# Patient Record
Sex: MALE | Race: WHITE | NOT HISPANIC OR LATINO | Employment: OTHER | URBAN - METROPOLITAN AREA
[De-identification: names, ages, dates, MRNs, and addresses within clinical notes are randomized per-mention and may not be internally consistent; named-entity substitution may affect disease eponyms.]

---

## 2017-09-02 ENCOUNTER — HISTORICAL (OUTPATIENT)
Dept: INFUSION THERAPY | Facility: HOSPITAL | Age: 57
End: 2017-09-02

## 2017-09-03 ENCOUNTER — HISTORICAL (OUTPATIENT)
Dept: INFUSION THERAPY | Facility: HOSPITAL | Age: 57
End: 2017-09-03

## 2017-09-04 ENCOUNTER — HISTORICAL (OUTPATIENT)
Dept: INFUSION THERAPY | Facility: HOSPITAL | Age: 57
End: 2017-09-04

## 2017-09-05 ENCOUNTER — HISTORICAL (OUTPATIENT)
Dept: INFUSION THERAPY | Facility: HOSPITAL | Age: 57
End: 2017-09-05

## 2017-09-06 ENCOUNTER — HISTORICAL (OUTPATIENT)
Dept: INFUSION THERAPY | Facility: HOSPITAL | Age: 57
End: 2017-09-06

## 2017-09-07 ENCOUNTER — HISTORICAL (OUTPATIENT)
Dept: INFUSION THERAPY | Facility: HOSPITAL | Age: 57
End: 2017-09-07

## 2017-09-07 LAB
ABS NEUT (OLG): 5.96 X10(3)/MCL (ref 2.1–9.2)
ALBUMIN SERPL-MCNC: 2.7 GM/DL (ref 3.4–5)
ALBUMIN/GLOB SERPL: 1 RATIO (ref 1–2)
ALP SERPL-CCNC: 123 UNIT/L (ref 45–117)
ALT SERPL-CCNC: 66 UNIT/L (ref 12–78)
AST SERPL-CCNC: 36 UNIT/L (ref 15–37)
BASOPHILS # BLD AUTO: 0.01 X10(3)/MCL
BASOPHILS NFR BLD AUTO: 0 % (ref 0–1)
BILIRUB SERPL-MCNC: 0.4 MG/DL (ref 0.2–1)
BILIRUBIN DIRECT+TOT PNL SERPL-MCNC: 0.1 MG/DL
BILIRUBIN DIRECT+TOT PNL SERPL-MCNC: 0.3 MG/DL
BUN SERPL-MCNC: 6 MG/DL (ref 7–18)
CALCIUM SERPL-MCNC: 8.5 MG/DL (ref 8.5–10.1)
CHLORIDE SERPL-SCNC: 104 MMOL/L (ref 98–107)
CO2 SERPL-SCNC: 32 MMOL/L (ref 21–32)
CREAT SERPL-MCNC: 1 MG/DL (ref 0.6–1.3)
EOSINOPHIL # BLD AUTO: 0.03 10*3/UL
EOSINOPHIL NFR BLD AUTO: 0 % (ref 0–5)
ERYTHROCYTE [DISTWIDTH] IN BLOOD BY AUTOMATED COUNT: 17.2 % (ref 11.5–14.5)
GLOBULIN SER-MCNC: 4.3 GM/ML (ref 2.3–3.5)
GLUCOSE SERPL-MCNC: 302 MG/DL (ref 74–106)
HCT VFR BLD AUTO: 29.8 % (ref 40–51)
HGB BLD-MCNC: 8.8 GM/DL (ref 13.5–17.5)
IMM GRANULOCYTES # BLD AUTO: 0.28 10*3/UL
IMM GRANULOCYTES NFR BLD AUTO: 3 %
LYMPHOCYTES # BLD AUTO: 1.37 X10(3)/MCL
LYMPHOCYTES NFR BLD AUTO: 17 % (ref 15–40)
MCH RBC QN AUTO: 23.3 PG (ref 26–34)
MCHC RBC AUTO-ENTMCNC: 29.5 GM/DL (ref 31–37)
MCV RBC AUTO: 78.8 FL (ref 80–100)
MONOCYTES # BLD AUTO: 0.6 X10(3)/MCL
MONOCYTES NFR BLD AUTO: 7 % (ref 4–12)
NEUTROPHILS # BLD AUTO: 5.96 X10(3)/MCL
NEUTROPHILS NFR BLD AUTO: 72 X10(3)/MCL
PLATELET # BLD AUTO: 58 X10(3)/MCL (ref 130–400)
PMV BLD AUTO: 10.3 FL (ref 7.4–10.4)
POTASSIUM SERPL-SCNC: 2.9 MMOL/L (ref 3.5–5.1)
PROT SERPL-MCNC: 7 GM/DL (ref 6.4–8.2)
RBC # BLD AUTO: 3.78 X10(6)/MCL (ref 4.5–5.9)
SODIUM SERPL-SCNC: 143 MMOL/L (ref 136–145)
WBC # SPEC AUTO: 8.2 X10(3)/MCL (ref 4.5–11)

## 2017-09-08 ENCOUNTER — HISTORICAL (OUTPATIENT)
Dept: INFUSION THERAPY | Facility: HOSPITAL | Age: 57
End: 2017-09-08

## 2017-09-14 ENCOUNTER — HISTORICAL (OUTPATIENT)
Dept: INFUSION THERAPY | Facility: HOSPITAL | Age: 57
End: 2017-09-14

## 2017-09-14 LAB
ABS NEUT (OLG): 4.5 X10(3)/MCL (ref 2.1–9.2)
ALBUMIN SERPL-MCNC: 2.9 GM/DL (ref 3.4–5)
ALBUMIN/GLOB SERPL: 1 RATIO (ref 1–2)
ALP SERPL-CCNC: 117 UNIT/L (ref 45–117)
ALT SERPL-CCNC: 62 UNIT/L (ref 12–78)
ANISOCYTOSIS BLD QL SMEAR: NORMAL
AST SERPL-CCNC: 46 UNIT/L (ref 15–37)
BASOPHILS # BLD AUTO: 0.02 X10(3)/MCL
BASOPHILS NFR BLD AUTO: 0 % (ref 0–1)
BILIRUB SERPL-MCNC: 0.5 MG/DL (ref 0.2–1)
BILIRUBIN DIRECT+TOT PNL SERPL-MCNC: 0.2 MG/DL
BILIRUBIN DIRECT+TOT PNL SERPL-MCNC: 0.3 MG/DL
BUN SERPL-MCNC: 5 MG/DL (ref 7–18)
CALCIUM SERPL-MCNC: 9 MG/DL (ref 8.5–10.1)
CHLORIDE SERPL-SCNC: 99 MMOL/L (ref 98–107)
CO2 SERPL-SCNC: 34 MMOL/L (ref 21–32)
CREAT SERPL-MCNC: 0.9 MG/DL (ref 0.6–1.3)
EOSINOPHIL # BLD AUTO: 0.04 10*3/UL
EOSINOPHIL NFR BLD AUTO: 1 % (ref 0–5)
ERYTHROCYTE [DISTWIDTH] IN BLOOD BY AUTOMATED COUNT: 17.5 % (ref 11.5–14.5)
GLOBULIN SER-MCNC: 4.5 GM/ML (ref 2.3–3.5)
GLUCOSE SERPL-MCNC: 287 MG/DL (ref 74–106)
HCT VFR BLD AUTO: 33.3 % (ref 40–51)
HGB BLD-MCNC: 9.9 GM/DL (ref 13.5–17.5)
IMM GRANULOCYTES # BLD AUTO: 0.09 10*3/UL
IMM GRANULOCYTES NFR BLD AUTO: 2 %
LYMPHOCYTES # BLD AUTO: 0.73 X10(3)/MCL
LYMPHOCYTES NFR BLD AUTO: 13 % (ref 15–40)
MCH RBC QN AUTO: 23.6 PG (ref 26–34)
MCHC RBC AUTO-ENTMCNC: 29.7 GM/DL (ref 31–37)
MCV RBC AUTO: 79.3 FL (ref 80–100)
MICROCYTES BLD QL SMEAR: NORMAL
MONOCYTES # BLD AUTO: 0.31 X10(3)/MCL
MONOCYTES NFR BLD AUTO: 5 % (ref 4–12)
NEUTROPHILS # BLD AUTO: 4.5 X10(3)/MCL
NEUTROPHILS NFR BLD AUTO: 79 X10(3)/MCL
PLATELET # BLD AUTO: 70 X10(3)/MCL (ref 130–400)
PLATELET # BLD EST: NORMAL 10*3/UL
PMV BLD AUTO: 10.7 FL (ref 7.4–10.4)
POLYCHROMASIA BLD QL SMEAR: NORMAL
POTASSIUM SERPL-SCNC: 3.2 MMOL/L (ref 3.5–5.1)
PROT SERPL-MCNC: 7.4 GM/DL (ref 6.4–8.2)
RBC # BLD AUTO: 4.2 X10(6)/MCL (ref 4.5–5.9)
RBC MORPH BLD: NORMAL
SODIUM SERPL-SCNC: 140 MMOL/L (ref 136–145)
SPHEROCYTES BLD QL SMEAR: NORMAL
WBC # SPEC AUTO: 5.7 X10(3)/MCL (ref 4.5–11)

## 2018-04-11 ENCOUNTER — HOSPITAL ENCOUNTER (OUTPATIENT)
Dept: MEDSURG UNIT | Facility: HOSPITAL | Age: 58
End: 2018-04-12
Attending: HOSPITALIST | Admitting: HOSPITALIST

## 2018-04-11 LAB
ABS NEUT (OLG): 7.31 X10(3)/MCL (ref 2.1–9.2)
ALBUMIN SERPL-MCNC: 3.6 GM/DL (ref 3.4–5)
ALBUMIN/GLOB SERPL: 1 RATIO (ref 1–2)
ALP SERPL-CCNC: 115 UNIT/L (ref 45–117)
ALT SERPL-CCNC: 19 UNIT/L (ref 12–78)
APPEARANCE, UA: CLEAR
AST SERPL-CCNC: 12 UNIT/L (ref 15–37)
BACTERIA #/AREA URNS AUTO: ABNORMAL /[HPF]
BASOPHILS # BLD AUTO: 0.06 X10(3)/MCL
BASOPHILS NFR BLD AUTO: 1 %
BILIRUB SERPL-MCNC: 0.3 MG/DL (ref 0.2–1)
BILIRUB UR QL STRIP: NEGATIVE
BILIRUBIN DIRECT+TOT PNL SERPL-MCNC: 0.1 MG/DL
BILIRUBIN DIRECT+TOT PNL SERPL-MCNC: 0.2 MG/DL
BUN SERPL-MCNC: 11 MG/DL (ref 7–18)
CALCIUM SERPL-MCNC: 8.7 MG/DL (ref 8.5–10.1)
CHLORIDE SERPL-SCNC: 106 MMOL/L (ref 98–107)
CO2 SERPL-SCNC: 31 MMOL/L (ref 21–32)
COLOR UR: YELLOW
CREAT SERPL-MCNC: 0.9 MG/DL (ref 0.6–1.3)
EOSINOPHIL # BLD AUTO: 0.16 10*3/UL
EOSINOPHIL NFR BLD AUTO: 2 %
ERYTHROCYTE [DISTWIDTH] IN BLOOD BY AUTOMATED COUNT: 17.9 % (ref 11.5–14.5)
GLOBULIN SER-MCNC: 3.6 GM/ML (ref 2.3–3.5)
GLUCOSE (UA): NORMAL
GLUCOSE SERPL-MCNC: 129 MG/DL (ref 74–106)
HCO3 UR-SCNC: 28.5 MMOL/L (ref 22–26)
HCT VFR BLD AUTO: 34.2 % (ref 40–51)
HGB BLD-MCNC: 9.5 GM/DL (ref 13.5–17.5)
HGB UR QL STRIP: NEGATIVE
HYALINE CASTS #/AREA URNS LPF: ABNORMAL /[LPF]
IMM GRANULOCYTES # BLD AUTO: 0.07 10*3/UL
IMM GRANULOCYTES NFR BLD AUTO: 1 %
KETONES UR QL STRIP: ABNORMAL
LEUKOCYTE ESTERASE UR QL STRIP: 75 LEU/UL
LYMPHOCYTES # BLD AUTO: 1.6 X10(3)/MCL
LYMPHOCYTES NFR BLD AUTO: 16 % (ref 13–40)
MCH RBC QN AUTO: 20.8 PG (ref 26–34)
MCHC RBC AUTO-ENTMCNC: 27.8 GM/DL (ref 31–37)
MCV RBC AUTO: 74.8 FL (ref 80–100)
MONOCYTES # BLD AUTO: 0.71 X10(3)/MCL
MONOCYTES NFR BLD AUTO: 7 % (ref 4–12)
NEUTROPHILS # BLD AUTO: 7.31 X10(3)/MCL
NEUTROPHILS NFR BLD AUTO: 74 X10(3)/MCL
NITRITE UR QL STRIP: NEGATIVE
O2 HGB ARTERIAL: 88.9 % (ref 94–100)
PCO2 BLDA: 47 MMHG (ref 35–45)
PH SMN: 7.39 [PH] (ref 7.35–7.45)
PH UR STRIP: 6 [PH] (ref 4.5–8)
PLATELET # BLD AUTO: 415 X10(3)/MCL (ref 130–400)
PMV BLD AUTO: 9.6 FL (ref 7.4–10.4)
PO2 BLDA: 68 MMHG (ref 75–100)
POC ALLENS TEST: POSITIVE
POC BE: 3 (ref -2–2)
POC CO HGB: 6.1 % (ref 0.5–1.5)
POC CO2: 29.9 MMOL/L (ref 22–27)
POC MET HGB: 0.1 % (ref 0–1.5)
POC SAMPLESOURCE: ABNORMAL
POC SATURATED O2: 94.8 % (ref 95–98)
POC SITE: ABNORMAL
POC THB: 9.7 GM/DL (ref 12–18)
POC TREATMENT: ABNORMAL
POTASSIUM SERPL-SCNC: 4.6 MMOL/L (ref 3.5–5.1)
PROT SERPL-MCNC: 7.2 GM/DL (ref 6.4–8.2)
PROT UR QL STRIP: 30 MG/DL
RBC # BLD AUTO: 4.57 X10(6)/MCL (ref 4.5–5.9)
RBC #/AREA URNS AUTO: ABNORMAL /[HPF]
SODIUM SERPL-SCNC: 143 MMOL/L (ref 136–145)
SP GR UR STRIP: 1.03 (ref 1–1.03)
SQUAMOUS #/AREA URNS LPF: ABNORMAL /[LPF]
UROBILINOGEN UR STRIP-ACNC: 2 MG/DL
WBC # SPEC AUTO: 9.9 X10(3)/MCL (ref 4.5–11)
WBC #/AREA URNS AUTO: ABNORMAL /HPF

## 2018-04-12 LAB
ABS NEUT (OLG): 10.34 X10(3)/MCL (ref 2.1–9.2)
ALBUMIN SERPL-MCNC: 3.3 GM/DL (ref 3.4–5)
ALBUMIN/GLOB SERPL: 1 RATIO (ref 1–2)
ALP SERPL-CCNC: 101 UNIT/L (ref 45–117)
ALT SERPL-CCNC: 17 UNIT/L (ref 12–78)
AST SERPL-CCNC: 14 UNIT/L (ref 15–37)
BASOPHILS # BLD AUTO: 0.02 X10(3)/MCL
BASOPHILS NFR BLD AUTO: 0 %
BILIRUB SERPL-MCNC: 0.2 MG/DL (ref 0.2–1)
BILIRUBIN DIRECT+TOT PNL SERPL-MCNC: <0.1 MG/DL
BILIRUBIN DIRECT+TOT PNL SERPL-MCNC: ABNORMAL MG/DL
BUN SERPL-MCNC: 13 MG/DL (ref 7–18)
CALCIUM SERPL-MCNC: 8.6 MG/DL (ref 8.5–10.1)
CHLORIDE SERPL-SCNC: 103 MMOL/L (ref 98–107)
CO2 SERPL-SCNC: 28 MMOL/L (ref 21–32)
CREAT SERPL-MCNC: 0.7 MG/DL (ref 0.6–1.3)
ERYTHROCYTE [DISTWIDTH] IN BLOOD BY AUTOMATED COUNT: 17.5 % (ref 11.5–14.5)
GLOBULIN SER-MCNC: 3.4 GM/ML (ref 2.3–3.5)
GLUCOSE SERPL-MCNC: 199 MG/DL (ref 74–106)
HCT VFR BLD AUTO: 30.9 % (ref 40–51)
HGB BLD-MCNC: 8.9 GM/DL (ref 13.5–17.5)
IMM GRANULOCYTES # BLD AUTO: 0.16 10*3/UL
IMM GRANULOCYTES NFR BLD AUTO: 1 %
LYMPHOCYTES # BLD AUTO: 1.16 X10(3)/MCL
LYMPHOCYTES NFR BLD AUTO: 10 % (ref 13–40)
MCH RBC QN AUTO: 21.1 PG (ref 26–34)
MCHC RBC AUTO-ENTMCNC: 28.8 GM/DL (ref 31–37)
MCV RBC AUTO: 73.2 FL (ref 80–100)
MONOCYTES # BLD AUTO: 0.48 X10(3)/MCL
MONOCYTES NFR BLD AUTO: 4 % (ref 4–12)
NEUTROPHILS # BLD AUTO: 10.34 X10(3)/MCL
NEUTROPHILS NFR BLD AUTO: 85 X10(3)/MCL
PLATELET # BLD AUTO: 414 X10(3)/MCL (ref 130–400)
PMV BLD AUTO: 9.8 FL (ref 7.4–10.4)
POTASSIUM SERPL-SCNC: 3.8 MMOL/L (ref 3.5–5.1)
PROT SERPL-MCNC: 6.7 GM/DL (ref 6.4–8.2)
RBC # BLD AUTO: 4.22 X10(6)/MCL (ref 4.5–5.9)
SODIUM SERPL-SCNC: 140 MMOL/L (ref 136–145)
WBC # SPEC AUTO: 12.2 X10(3)/MCL (ref 4.5–11)

## 2018-04-17 ENCOUNTER — HOSPITAL ENCOUNTER (OUTPATIENT)
Dept: INTENSIVE CARE | Facility: HOSPITAL | Age: 58
End: 2018-04-19
Attending: HOSPITALIST | Admitting: HOSPITALIST

## 2018-04-17 LAB
ABS NEUT (OLG): 12.23 X10(3)/MCL (ref 2.1–9.2)
ALBUMIN SERPL-MCNC: 3.5 GM/DL (ref 3.4–5)
ALBUMIN/GLOB SERPL: 1 RATIO (ref 1–2)
ALP SERPL-CCNC: 100 UNIT/L (ref 45–117)
ALT SERPL-CCNC: 23 UNIT/L (ref 12–78)
APPEARANCE, UA: CLEAR
AST SERPL-CCNC: 14 UNIT/L (ref 15–37)
BACTERIA #/AREA URNS AUTO: ABNORMAL /[HPF]
BASOPHILS # BLD AUTO: 0.03 X10(3)/MCL
BASOPHILS NFR BLD AUTO: 0 %
BILIRUB SERPL-MCNC: 0.4 MG/DL (ref 0.2–1)
BILIRUB UR QL STRIP: NEGATIVE
BILIRUBIN DIRECT+TOT PNL SERPL-MCNC: 0.1 MG/DL
BILIRUBIN DIRECT+TOT PNL SERPL-MCNC: 0.3 MG/DL
BUN SERPL-MCNC: 10 MG/DL (ref 7–18)
CALCIUM SERPL-MCNC: 8.9 MG/DL (ref 8.5–10.1)
CHLORIDE SERPL-SCNC: 106 MMOL/L (ref 98–107)
CO2 SERPL-SCNC: 31 MMOL/L (ref 21–32)
COLOR UR: YELLOW
CREAT SERPL-MCNC: 0.8 MG/DL (ref 0.6–1.3)
EOSINOPHIL # BLD AUTO: 0.1 X10(3)/MCL
EOSINOPHIL NFR BLD AUTO: 1 %
ERYTHROCYTE [DISTWIDTH] IN BLOOD BY AUTOMATED COUNT: 17.3 % (ref 11.5–14.5)
GLOBULIN SER-MCNC: 3.4 GM/ML (ref 2.3–3.5)
GLUCOSE (UA): NORMAL
GLUCOSE SERPL-MCNC: 127 MG/DL (ref 74–106)
HCT VFR BLD AUTO: 35 % (ref 40–51)
HGB BLD-MCNC: 9.8 GM/DL (ref 13.5–17.5)
HGB UR QL STRIP: NEGATIVE
HYALINE CASTS #/AREA URNS LPF: ABNORMAL /[LPF]
IMM GRANULOCYTES # BLD AUTO: 0.1 10*3/UL
IMM GRANULOCYTES NFR BLD AUTO: 1 %
KETONES UR QL STRIP: NEGATIVE
LACTATE SERPL-SCNC: 1.9 MMOL/L (ref 0.4–2)
LEUKOCYTE ESTERASE UR QL STRIP: NEGATIVE
LYMPHOCYTES # BLD AUTO: 1.31 X10(3)/MCL
LYMPHOCYTES NFR BLD AUTO: 9 % (ref 13–40)
MCH RBC QN AUTO: 20.8 PG (ref 26–34)
MCHC RBC AUTO-ENTMCNC: 28 GM/DL (ref 31–37)
MCV RBC AUTO: 74.2 FL (ref 80–100)
MONOCYTES # BLD AUTO: 0.72 X10(3)/MCL
MONOCYTES NFR BLD AUTO: 5 % (ref 4–12)
NEUTROPHILS # BLD AUTO: 12.23 X10(3)/MCL
NEUTROPHILS NFR BLD AUTO: 84 X10(3)/MCL
NITRITE UR QL STRIP: NEGATIVE
PH UR STRIP: 6 [PH] (ref 4.5–8)
PLATELET # BLD AUTO: 442 X10(3)/MCL (ref 130–400)
PMV BLD AUTO: 9.4 FL (ref 7.4–10.4)
POTASSIUM SERPL-SCNC: 4 MMOL/L (ref 3.5–5.1)
PROT SERPL-MCNC: 6.9 GM/DL (ref 6.4–8.2)
PROT UR QL STRIP: 20 MG/DL
RBC # BLD AUTO: 4.72 X10(6)/MCL (ref 4.5–5.9)
RBC #/AREA URNS AUTO: ABNORMAL /[HPF]
SODIUM SERPL-SCNC: 143 MMOL/L (ref 136–145)
SP GR UR STRIP: >1.03 (ref 1–1.03)
SQUAMOUS #/AREA URNS LPF: ABNORMAL /[LPF]
UROBILINOGEN UR STRIP-ACNC: NORMAL
WBC # SPEC AUTO: 14.5 X10(3)/MCL (ref 4.5–11)
WBC #/AREA URNS AUTO: ABNORMAL /HPF

## 2018-04-18 LAB
ABS NEUT (OLG): 6.6 X10(3)/MCL (ref 2.1–9.2)
ALBUMIN SERPL-MCNC: 3.1 GM/DL (ref 3.4–5)
ALBUMIN/GLOB SERPL: 1 RATIO (ref 1–2)
ALP SERPL-CCNC: 87 UNIT/L (ref 45–117)
ALT SERPL-CCNC: 20 UNIT/L (ref 12–78)
AST SERPL-CCNC: 11 UNIT/L (ref 15–37)
BASOPHILS # BLD AUTO: 0.02 X10(3)/MCL
BASOPHILS NFR BLD AUTO: 0 %
BILIRUB SERPL-MCNC: 0.3 MG/DL (ref 0.2–1)
BILIRUBIN DIRECT+TOT PNL SERPL-MCNC: 0.1 MG/DL
BILIRUBIN DIRECT+TOT PNL SERPL-MCNC: 0.2 MG/DL
BUN SERPL-MCNC: 13 MG/DL (ref 7–18)
CALCIUM SERPL-MCNC: 8.6 MG/DL (ref 8.5–10.1)
CHLORIDE SERPL-SCNC: 106 MMOL/L (ref 98–107)
CO2 SERPL-SCNC: 31 MMOL/L (ref 21–32)
CREAT SERPL-MCNC: 0.8 MG/DL (ref 0.6–1.3)
EOSINOPHIL # BLD AUTO: 0.07 X10(3)/MCL
EOSINOPHIL NFR BLD AUTO: 1 %
ERYTHROCYTE [DISTWIDTH] IN BLOOD BY AUTOMATED COUNT: 17.3 % (ref 11.5–14.5)
GLOBULIN SER-MCNC: 4.4 GM/ML (ref 2.3–3.5)
GLUCOSE SERPL-MCNC: 92 MG/DL (ref 74–106)
HCT VFR BLD AUTO: 31.3 % (ref 40–51)
HGB BLD-MCNC: 8.8 GM/DL (ref 13.5–17.5)
IMM GRANULOCYTES # BLD AUTO: 0.06 10*3/UL
IMM GRANULOCYTES NFR BLD AUTO: 1 %
IRON SATN MFR SERPL: 6.2 % (ref 15–50)
IRON SERPL-MCNC: 26 MCG/DL (ref 65–175)
LYMPHOCYTES # BLD AUTO: 1.24 X10(3)/MCL
LYMPHOCYTES NFR BLD AUTO: 14 % (ref 13–40)
MCH RBC QN AUTO: 20.7 PG (ref 26–34)
MCHC RBC AUTO-ENTMCNC: 28.1 GM/DL (ref 31–37)
MCV RBC AUTO: 73.5 FL (ref 80–100)
MONOCYTES # BLD AUTO: 0.62 X10(3)/MCL
MONOCYTES NFR BLD AUTO: 7 % (ref 4–12)
NEUTROPHILS # BLD AUTO: 6.6 X10(3)/MCL
NEUTROPHILS NFR BLD AUTO: 77 X10(3)/MCL
PLATELET # BLD AUTO: 392 X10(3)/MCL (ref 130–400)
PMV BLD AUTO: 10 FL (ref 7.4–10.4)
POTASSIUM SERPL-SCNC: 3.7 MMOL/L (ref 3.5–5.1)
PROT SERPL-MCNC: 7.5 GM/DL (ref 6.4–8.2)
RBC # BLD AUTO: 4.26 X10(6)/MCL (ref 4.5–5.9)
SODIUM SERPL-SCNC: 142 MMOL/L (ref 136–145)
TIBC SERPL-MCNC: 418 MCG/DL (ref 250–450)
TRANSFERRIN SERPL-MCNC: 324 MG/DL (ref 200–360)
WBC # SPEC AUTO: 8.6 X10(3)/MCL (ref 4.5–11)

## 2018-04-19 LAB
ABS NEUT (OLG): 4.4 X10(3)/MCL (ref 2.1–9.2)
ALBUMIN SERPL-MCNC: 2.9 GM/DL (ref 3.4–5)
ALBUMIN/GLOB SERPL: 0 RATIO (ref 1–2)
ALP SERPL-CCNC: 77 UNIT/L (ref 45–117)
ALT SERPL-CCNC: 22 UNIT/L (ref 12–78)
AST SERPL-CCNC: 16 UNIT/L (ref 15–37)
BASOPHILS # BLD AUTO: 0.03 X10(3)/MCL
BASOPHILS NFR BLD AUTO: 0 %
BILIRUB SERPL-MCNC: 0.2 MG/DL (ref 0.2–1)
BILIRUBIN DIRECT+TOT PNL SERPL-MCNC: <0.1 MG/DL
BILIRUBIN DIRECT+TOT PNL SERPL-MCNC: ABNORMAL MG/DL
BUN SERPL-MCNC: 12 MG/DL (ref 7–18)
CALCIUM SERPL-MCNC: 8.5 MG/DL (ref 8.5–10.1)
CHLORIDE SERPL-SCNC: 105 MMOL/L (ref 98–107)
CO2 SERPL-SCNC: 30 MMOL/L (ref 21–32)
CREAT SERPL-MCNC: 0.8 MG/DL (ref 0.6–1.3)
EOSINOPHIL # BLD AUTO: 0.11 X10(3)/MCL
EOSINOPHIL NFR BLD AUTO: 2 %
ERYTHROCYTE [DISTWIDTH] IN BLOOD BY AUTOMATED COUNT: 17.3 % (ref 11.5–14.5)
GLOBULIN SER-MCNC: 6.1 GM/ML (ref 2.3–3.5)
GLUCOSE SERPL-MCNC: 114 MG/DL (ref 74–106)
HCT VFR BLD AUTO: 30.1 % (ref 40–51)
HGB BLD-MCNC: 8.4 GM/DL (ref 13.5–17.5)
IMM GRANULOCYTES # BLD AUTO: 0.04 10*3/UL
IMM GRANULOCYTES NFR BLD AUTO: 1 %
LYMPHOCYTES # BLD AUTO: 1.43 X10(3)/MCL
LYMPHOCYTES NFR BLD AUTO: 22 % (ref 13–40)
MCH RBC QN AUTO: 20.6 PG (ref 26–34)
MCHC RBC AUTO-ENTMCNC: 27.9 GM/DL (ref 31–37)
MCV RBC AUTO: 73.8 FL (ref 80–100)
MONOCYTES # BLD AUTO: 0.51 X10(3)/MCL
MONOCYTES NFR BLD AUTO: 8 % (ref 4–12)
NEUTROPHILS # BLD AUTO: 4.4 X10(3)/MCL
NEUTROPHILS NFR BLD AUTO: 68 X10(3)/MCL
PLATELET # BLD AUTO: 385 X10(3)/MCL (ref 130–400)
PMV BLD AUTO: 10.1 FL (ref 7.4–10.4)
POTASSIUM SERPL-SCNC: 3.3 MMOL/L (ref 3.5–5.1)
PROT SERPL-MCNC: 9 GM/DL (ref 6.4–8.2)
RBC # BLD AUTO: 4.08 X10(6)/MCL (ref 4.5–5.9)
SODIUM SERPL-SCNC: 140 MMOL/L (ref 136–145)
WBC # SPEC AUTO: 6.5 X10(3)/MCL (ref 4.5–11)

## 2018-04-27 ENCOUNTER — HISTORICAL (OUTPATIENT)
Dept: INFUSION THERAPY | Facility: HOSPITAL | Age: 58
End: 2018-04-27

## 2018-04-27 LAB
ALBUMIN SERPL-MCNC: 3.3 GM/DL (ref 3.4–5)
ALBUMIN/GLOB SERPL: 1 RATIO (ref 1–2)
ALP SERPL-CCNC: 79 UNIT/L (ref 45–117)
ALT SERPL-CCNC: 23 UNIT/L (ref 12–78)
AST SERPL-CCNC: 19 UNIT/L (ref 15–37)
BILIRUB SERPL-MCNC: 0.2 MG/DL (ref 0.2–1)
BILIRUBIN DIRECT+TOT PNL SERPL-MCNC: <0.1 MG/DL
BILIRUBIN DIRECT+TOT PNL SERPL-MCNC: ABNORMAL MG/DL
BUN SERPL-MCNC: 15 MG/DL (ref 7–18)
CALCIUM SERPL-MCNC: 8.8 MG/DL (ref 8.5–10.1)
CHLORIDE SERPL-SCNC: 106 MMOL/L (ref 98–107)
CO2 SERPL-SCNC: 29 MMOL/L (ref 21–32)
CREAT SERPL-MCNC: 0.8 MG/DL (ref 0.6–1.3)
GLOBULIN SER-MCNC: 4.5 GM/ML (ref 2.3–3.5)
GLUCOSE SERPL-MCNC: 164 MG/DL (ref 74–106)
POTASSIUM SERPL-SCNC: 3.7 MMOL/L (ref 3.5–5.1)
PROT SERPL-MCNC: 7.8 GM/DL (ref 6.4–8.2)
SODIUM SERPL-SCNC: 142 MMOL/L (ref 136–145)

## 2018-05-04 ENCOUNTER — HISTORICAL (OUTPATIENT)
Dept: INFUSION THERAPY | Facility: HOSPITAL | Age: 58
End: 2018-05-04

## 2018-05-11 ENCOUNTER — HISTORICAL (OUTPATIENT)
Dept: INFUSION THERAPY | Facility: HOSPITAL | Age: 58
End: 2018-05-11

## 2018-05-11 LAB
BUN SERPL-MCNC: 14 MG/DL (ref 7–18)
CALCIUM SERPL-MCNC: 8.7 MG/DL (ref 8.5–10.1)
CHLORIDE SERPL-SCNC: 104 MMOL/L (ref 98–107)
CO2 SERPL-SCNC: 28 MMOL/L (ref 21–32)
CREAT SERPL-MCNC: 1 MG/DL (ref 0.6–1.3)
CREAT/UREA NIT SERPL: 14
GLUCOSE SERPL-MCNC: 208 MG/DL (ref 74–106)
POTASSIUM SERPL-SCNC: 3.9 MMOL/L (ref 3.5–5.1)
SODIUM SERPL-SCNC: 137 MMOL/L (ref 136–145)

## 2018-05-18 ENCOUNTER — HISTORICAL (OUTPATIENT)
Dept: INFUSION THERAPY | Facility: HOSPITAL | Age: 58
End: 2018-05-18

## 2018-05-18 LAB
BUN SERPL-MCNC: 14 MG/DL (ref 7–18)
CALCIUM SERPL-MCNC: 9.2 MG/DL (ref 8.5–10.1)
CHLORIDE SERPL-SCNC: 103 MMOL/L (ref 98–107)
CO2 SERPL-SCNC: 29 MMOL/L (ref 21–32)
CREAT SERPL-MCNC: 1 MG/DL (ref 0.6–1.3)
CREAT/UREA NIT SERPL: 14
GLUCOSE SERPL-MCNC: 163 MG/DL (ref 74–106)
POTASSIUM SERPL-SCNC: 4 MMOL/L (ref 3.5–5.1)
SODIUM SERPL-SCNC: 138 MMOL/L (ref 136–145)

## 2018-05-25 ENCOUNTER — HISTORICAL (OUTPATIENT)
Dept: INFUSION THERAPY | Facility: HOSPITAL | Age: 58
End: 2018-05-25

## 2018-05-25 LAB
BUN SERPL-MCNC: 11 MG/DL (ref 7–18)
CALCIUM SERPL-MCNC: 8.7 MG/DL (ref 8.5–10.1)
CHLORIDE SERPL-SCNC: 103 MMOL/L (ref 98–107)
CO2 SERPL-SCNC: 29 MMOL/L (ref 21–32)
CREAT SERPL-MCNC: 1 MG/DL (ref 0.6–1.3)
CREAT/UREA NIT SERPL: 11
GLUCOSE SERPL-MCNC: 151 MG/DL (ref 74–106)
POTASSIUM SERPL-SCNC: 4.1 MMOL/L (ref 3.5–5.1)
SODIUM SERPL-SCNC: 140 MMOL/L (ref 136–145)

## 2018-06-01 ENCOUNTER — HISTORICAL (OUTPATIENT)
Dept: INFUSION THERAPY | Facility: HOSPITAL | Age: 58
End: 2018-06-01

## 2018-06-01 LAB
BUN SERPL-MCNC: 11 MG/DL (ref 7–18)
CALCIUM SERPL-MCNC: 8.8 MG/DL (ref 8.5–10.1)
CHLORIDE SERPL-SCNC: 105 MMOL/L (ref 98–107)
CO2 SERPL-SCNC: 29 MMOL/L (ref 21–32)
CREAT SERPL-MCNC: 0.9 MG/DL (ref 0.6–1.3)
CREAT/UREA NIT SERPL: 12
GLUCOSE SERPL-MCNC: 174 MG/DL (ref 74–106)
POTASSIUM SERPL-SCNC: 3.9 MMOL/L (ref 3.5–5.1)
SODIUM SERPL-SCNC: 144 MMOL/L (ref 136–145)

## 2018-09-06 ENCOUNTER — HISTORICAL (OUTPATIENT)
Dept: INTERNAL MEDICINE | Facility: CLINIC | Age: 58
End: 2018-09-06

## 2018-09-06 LAB
ABS NEUT (OLG): 7.09 X10(3)/MCL
ALBUMIN SERPL-MCNC: 3.4 GM/DL (ref 3.4–5)
ALBUMIN/GLOB SERPL: 1 RATIO (ref 1–2)
ALP SERPL-CCNC: 95 UNIT/L (ref 45–117)
ALT SERPL-CCNC: 26 UNIT/L (ref 12–78)
ANISOCYTOSIS BLD QL SMEAR: NORMAL
APPEARANCE, UA: CLEAR
AST SERPL-CCNC: 11 UNIT/L (ref 15–37)
BACTERIA #/AREA URNS AUTO: ABNORMAL /[HPF]
BASOPHILS # BLD AUTO: 0.05 X10(3)/MCL
BASOPHILS NFR BLD AUTO: 0 %
BILIRUB SERPL-MCNC: 0.2 MG/DL (ref 0.2–1)
BILIRUB UR QL STRIP: NEGATIVE
BILIRUBIN DIRECT+TOT PNL SERPL-MCNC: <0.1 MG/DL
BILIRUBIN DIRECT+TOT PNL SERPL-MCNC: ABNORMAL MG/DL
BUN SERPL-MCNC: 14 MG/DL (ref 7–18)
CALCIUM SERPL-MCNC: 9.4 MG/DL (ref 8.5–10.1)
CHLORIDE SERPL-SCNC: 105 MMOL/L (ref 98–107)
CHOLEST SERPL-MCNC: 156 MG/DL
CHOLEST/HDLC SERPL: 2.9 {RATIO} (ref 0–5)
CO2 SERPL-SCNC: 33 MMOL/L (ref 21–32)
COLOR UR: YELLOW
CREAT SERPL-MCNC: 1 MG/DL (ref 0.6–1.3)
CREAT UR-MCNC: 280 MG/DL
EOSINOPHIL # BLD AUTO: 0.14 X10(3)/MCL
EOSINOPHIL NFR BLD AUTO: 1 %
ERYTHROCYTE [DISTWIDTH] IN BLOOD BY AUTOMATED COUNT: 17.5 % (ref 11.5–14.5)
EST. AVERAGE GLUCOSE BLD GHB EST-MCNC: 206 MG/DL
GLOBULIN SER-MCNC: 3.6 GM/ML (ref 2.3–3.5)
GLUCOSE (UA): NORMAL
GLUCOSE SERPL-MCNC: 197 MG/DL (ref 74–106)
HBA1C MFR BLD: 8.8 % (ref 4.2–6.3)
HCT VFR BLD AUTO: 34.3 % (ref 40–51)
HDLC SERPL-MCNC: 54 MG/DL
HGB BLD-MCNC: 9.3 GM/DL (ref 13.5–17.5)
HGB UR QL STRIP: NEGATIVE
HYALINE CASTS #/AREA URNS LPF: ABNORMAL /[LPF]
HYPOCHROMIA BLD QL SMEAR: NORMAL
IMM GRANULOCYTES # BLD AUTO: 0.11 10*3/UL
IMM GRANULOCYTES NFR BLD AUTO: 1 %
KETONES UR QL STRIP: NEGATIVE
LDLC SERPL CALC-MCNC: 69 MG/DL (ref 0–130)
LEUKOCYTE ESTERASE UR QL STRIP: NEGATIVE
LYMPHOCYTES # BLD AUTO: 2.56 X10(3)/MCL
LYMPHOCYTES NFR BLD AUTO: 24 % (ref 13–40)
MCH RBC QN AUTO: 20.3 PG (ref 26–34)
MCHC RBC AUTO-ENTMCNC: 27.1 GM/DL (ref 31–37)
MCV RBC AUTO: 74.7 FL (ref 80–100)
MICROALBUMIN UR-MCNC: 56 MG/L (ref 0–19)
MICROALBUMIN/CREAT RATIO PNL UR: 20 MCG/MG CR (ref 0–29)
MICROCYTES BLD QL SMEAR: NORMAL
MONOCYTES # BLD AUTO: 0.83 X10(3)/MCL
MONOCYTES NFR BLD AUTO: 8 % (ref 4–12)
NEUTROPHILS # BLD AUTO: 7.09 X10(3)/MCL
NEUTROPHILS NFR BLD AUTO: 66 %
NITRITE UR QL STRIP: NEGATIVE
PH UR STRIP: 6 [PH] (ref 4.5–8)
PLATELET # BLD AUTO: 451 X10(3)/MCL (ref 130–400)
PLATELET # BLD EST: NORMAL 10*3/UL
PMV BLD AUTO: 9.9 FL (ref 7.4–10.4)
POIKILOCYTOSIS BLD QL SMEAR: NORMAL
POLYCHROMASIA BLD QL SMEAR: NORMAL
POTASSIUM SERPL-SCNC: 4.1 MMOL/L (ref 3.5–5.1)
PROT SERPL-MCNC: 7 GM/DL (ref 6.4–8.2)
PROT UR QL STRIP: 30 MG/DL
RBC # BLD AUTO: 4.59 X10(6)/MCL (ref 4.5–5.9)
RBC #/AREA URNS AUTO: ABNORMAL /[HPF]
RBC MORPH BLD: NORMAL
SODIUM SERPL-SCNC: 143 MMOL/L (ref 136–145)
SP GR UR STRIP: 1.03 (ref 1–1.03)
SQUAMOUS #/AREA URNS LPF: ABNORMAL /[LPF]
TARGETS BLD QL SMEAR: NORMAL
TRIGL SERPL-MCNC: 167 MG/DL
UROBILINOGEN UR STRIP-ACNC: 3 MG/DL
VLDLC SERPL CALC-MCNC: 33 MG/DL
WBC # SPEC AUTO: 10.8 X10(3)/MCL (ref 4.5–11)
WBC #/AREA URNS AUTO: ABNORMAL /HPF

## 2019-01-21 ENCOUNTER — HISTORICAL (OUTPATIENT)
Dept: FAMILY MEDICINE | Facility: CLINIC | Age: 59
End: 2019-01-21

## 2019-01-21 LAB
EST. AVERAGE GLUCOSE BLD GHB EST-MCNC: 232 MG/DL
HBA1C MFR BLD: 9.7 % (ref 4.2–6.3)

## 2019-02-06 ENCOUNTER — HISTORICAL (OUTPATIENT)
Dept: RADIOLOGY | Facility: HOSPITAL | Age: 59
End: 2019-02-06

## 2019-04-23 ENCOUNTER — HISTORICAL (OUTPATIENT)
Dept: INTERNAL MEDICINE | Facility: CLINIC | Age: 59
End: 2019-04-23

## 2019-04-23 LAB
EST. AVERAGE GLUCOSE BLD GHB EST-MCNC: 186 MG/DL
HBA1C MFR BLD: 8.1 % (ref 4.2–6.3)

## 2019-05-29 ENCOUNTER — HISTORICAL (OUTPATIENT)
Dept: RADIOLOGY | Facility: HOSPITAL | Age: 59
End: 2019-05-29

## 2019-06-18 ENCOUNTER — HISTORICAL (OUTPATIENT)
Dept: RADIOLOGY | Facility: HOSPITAL | Age: 59
End: 2019-06-18

## 2019-08-16 ENCOUNTER — HISTORICAL (OUTPATIENT)
Dept: INTERNAL MEDICINE | Facility: CLINIC | Age: 59
End: 2019-08-16

## 2019-08-16 LAB
ABS NEUT (OLG): 5.72 X10(3)/MCL (ref 2.1–9.2)
ALBUMIN SERPL-MCNC: 3.4 GM/DL (ref 3.4–5)
ALBUMIN/GLOB SERPL: 1 RATIO (ref 1.1–2)
ALP SERPL-CCNC: 72 UNIT/L (ref 45–117)
ALT SERPL-CCNC: 30 UNIT/L (ref 12–78)
APPEARANCE, UA: CLEAR
AST SERPL-CCNC: 19 UNIT/L (ref 15–37)
BACTERIA #/AREA URNS AUTO: ABNORMAL /[HPF]
BASOPHILS # BLD AUTO: 0.03 X10(3)/MCL
BASOPHILS NFR BLD AUTO: 0 %
BILIRUB SERPL-MCNC: 0.2 MG/DL (ref 0.2–1)
BILIRUB UR QL STRIP: NEGATIVE
BILIRUBIN DIRECT+TOT PNL SERPL-MCNC: <0.1 MG/DL
BILIRUBIN DIRECT+TOT PNL SERPL-MCNC: ABNORMAL MG/DL
BUN SERPL-MCNC: 13 MG/DL (ref 7–18)
CALCIUM SERPL-MCNC: 8.7 MG/DL (ref 8.5–10.1)
CHLORIDE SERPL-SCNC: 108 MMOL/L (ref 98–107)
CO2 SERPL-SCNC: 34 MMOL/L (ref 21–32)
COLOR UR: YELLOW
CREAT SERPL-MCNC: 0.9 MG/DL (ref 0.6–1.3)
CREAT UR-MCNC: 369 MG/DL
EOSINOPHIL # BLD AUTO: 0.14 10*3/UL
EOSINOPHIL NFR BLD AUTO: 2 %
ERYTHROCYTE [DISTWIDTH] IN BLOOD BY AUTOMATED COUNT: 19.3 % (ref 11.5–14.5)
EST. AVERAGE GLUCOSE BLD GHB EST-MCNC: 229 MG/DL
GLOBULIN SER-MCNC: 3.4 GM/ML (ref 2.3–3.5)
GLUCOSE (UA): NORMAL
GLUCOSE SERPL-MCNC: 141 MG/DL (ref 74–106)
HBA1C MFR BLD: 9.6 % (ref 4.2–6.3)
HCT VFR BLD AUTO: 31.2 % (ref 40–51)
HGB BLD-MCNC: 8.5 GM/DL (ref 13.5–17.5)
HGB UR QL STRIP: NEGATIVE
HYALINE CASTS #/AREA URNS LPF: ABNORMAL /[LPF]
IMM GRANULOCYTES # BLD AUTO: 0.04 10*3/UL
IMM GRANULOCYTES NFR BLD AUTO: 0 %
KETONES UR QL STRIP: ABNORMAL
LEUKOCYTE ESTERASE UR QL STRIP: NEGATIVE
LYMPHOCYTES # BLD AUTO: 1.3 X10(3)/MCL
LYMPHOCYTES NFR BLD AUTO: 17 % (ref 13–40)
MCH RBC QN AUTO: 22.2 PG (ref 26–34)
MCHC RBC AUTO-ENTMCNC: 27.2 GM/DL (ref 31–37)
MCV RBC AUTO: 81.5 FL (ref 80–100)
MICROALBUMIN UR-MCNC: 76.9 MG/L (ref 0–19)
MICROALBUMIN/CREAT RATIO PNL UR: 20.8 MCG/MG CR (ref 0–29)
MONOCYTES # BLD AUTO: 0.6 X10(3)/MCL
MONOCYTES NFR BLD AUTO: 8 % (ref 0–24)
NEUTROPHILS # BLD AUTO: 5.72 X10(3)/MCL
NEUTROPHILS NFR BLD AUTO: 73 X10(3)/MCL
NITRITE UR QL STRIP: NEGATIVE
PH UR STRIP: 6 [PH] (ref 4.5–8)
PLATELET # BLD AUTO: 439 X10(3)/MCL (ref 130–400)
PMV BLD AUTO: 9.3 FL (ref 7.4–10.4)
POTASSIUM SERPL-SCNC: 4.6 MMOL/L (ref 3.5–5.1)
PROT SERPL-MCNC: 6.8 GM/DL (ref 6.4–8.2)
PROT UR QL STRIP: 30 MG/DL
RBC # BLD AUTO: 3.83 X10(6)/MCL (ref 4.5–5.9)
RBC #/AREA URNS AUTO: ABNORMAL /[HPF]
SODIUM SERPL-SCNC: 143 MMOL/L (ref 136–145)
SP GR UR STRIP: 1.03 (ref 1–1.03)
SQUAMOUS #/AREA URNS LPF: ABNORMAL /[LPF]
UROBILINOGEN UR STRIP-ACNC: 3 MG/DL
WBC # SPEC AUTO: 7.8 X10(3)/MCL (ref 4.5–11)
WBC #/AREA URNS AUTO: ABNORMAL /HPF

## 2019-08-21 ENCOUNTER — HISTORICAL (OUTPATIENT)
Dept: RADIOLOGY | Facility: HOSPITAL | Age: 59
End: 2019-08-21

## 2019-11-08 ENCOUNTER — HISTORICAL (OUTPATIENT)
Dept: INTERNAL MEDICINE | Facility: CLINIC | Age: 59
End: 2019-11-08

## 2019-11-08 LAB
EST. AVERAGE GLUCOSE BLD GHB EST-MCNC: 154 MG/DL
HBA1C MFR BLD: 7 % (ref 4.2–6.3)

## 2020-06-25 ENCOUNTER — HISTORICAL (OUTPATIENT)
Dept: ENDOSCOPY | Facility: HOSPITAL | Age: 60
End: 2020-06-25

## 2020-06-25 LAB — CRC RECOMMENDATION EXT: NORMAL

## 2020-11-03 ENCOUNTER — HISTORICAL (OUTPATIENT)
Dept: RADIOLOGY | Facility: HOSPITAL | Age: 60
End: 2020-11-03

## 2020-11-16 ENCOUNTER — HISTORICAL (OUTPATIENT)
Dept: CARDIOLOGY | Facility: HOSPITAL | Age: 60
End: 2020-11-16

## 2020-12-10 ENCOUNTER — HISTORICAL (OUTPATIENT)
Dept: ADMINISTRATIVE | Facility: HOSPITAL | Age: 60
End: 2020-12-10

## 2020-12-10 LAB
ALBUMIN SERPL-MCNC: 4.2 G/DL (ref 3.8–4.9)
ALBUMIN/GLOB SERPL: 1.8 {RATIO} (ref 1.2–2.2)
ALP SERPL-CCNC: 106 IU/L (ref 39–117)
ALT SERPL-CCNC: 21 IU/L (ref 0–44)
AST SERPL-CCNC: 18 IU/L (ref 0–40)
BASOPHILS # BLD AUTO: 0 X10E3/UL (ref 0–0.2)
BASOPHILS NFR BLD AUTO: 0 %
BILIRUB SERPL-MCNC: <0.2 MG/DL (ref 0–1.2)
BUN SERPL-MCNC: 21 MG/DL (ref 6–24)
CALCIUM SERPL-MCNC: 9.6 MG/DL (ref 8.7–10.2)
CHLORIDE SERPL-SCNC: 100 MMOL/L (ref 96–106)
CHOLEST SERPL-MCNC: 159 MG/DL (ref 100–199)
CHOLEST/HDLC SERPL: 4.1 RATIO (ref 0–5)
CO2 SERPL-SCNC: 25 MMOL/L (ref 20–29)
CREAT SERPL-MCNC: 1.21 MG/DL (ref 0.76–1.27)
CREAT/UREA NIT SERPL: 17 (ref 9–20)
EOSINOPHIL # BLD AUTO: 0.1 X10E3/UL (ref 0–0.4)
EOSINOPHIL NFR BLD AUTO: 2 %
ERYTHROCYTE [DISTWIDTH] IN BLOOD BY AUTOMATED COUNT: 17.2 % (ref 11.6–15.4)
GLOBULIN SER-MCNC: 2.3 G/DL (ref 1.5–4.5)
GLUCOSE SERPL-MCNC: 291 MG/DL (ref 65–99)
HCT VFR BLD AUTO: 40.9 % (ref 37.5–51)
HDLC SERPL-MCNC: 39 MG/DL
HGB BLD-MCNC: 12.3 G/DL (ref 13–17.7)
LDLC SERPL CALC-MCNC: 86 MG/DL (ref 0–99)
LYMPHOCYTES # BLD AUTO: 1.3 X10E3/UL (ref 0.7–3.1)
LYMPHOCYTES NFR BLD AUTO: 19 %
MCH RBC QN AUTO: 28.7 PG (ref 26.6–33)
MCHC RBC AUTO-ENTMCNC: 30.1 G/DL (ref 31.5–35.7)
MCV RBC AUTO: 95 FL (ref 79–97)
MICROALBUMIN/CREAT RATIO PNL UR: 24 MG/G CREAT (ref 0–29)
MONOCYTES # BLD AUTO: 0.5 X10E3/UL (ref 0.1–0.9)
MONOCYTES NFR BLD AUTO: 8 %
NEUTROPHILS # BLD AUTO: 4.7 X10E3/UL (ref 1.4–7)
NEUTROPHILS NFR BLD AUTO: 71 %
PLATELET # BLD AUTO: 309 X10E3/UL (ref 150–450)
POTASSIUM SERPL-SCNC: 4.3 MMOL/L (ref 3.5–5.2)
PROT SERPL-MCNC: 6.5 G/DL (ref 6–8.5)
RBC # BLD AUTO: 4.29 X10(6)/MCL (ref 4.14–5.8)
SODIUM SERPL-SCNC: 143 MMOL/L (ref 134–144)
TRIGL SERPL-MCNC: 200 MG/DL (ref 0–149)
VLDLC SERPL CALC-MCNC: 34 MG/DL (ref 5–40)
WBC # SPEC AUTO: 6.7 X10E3/UL (ref 3.4–10.8)

## 2021-03-31 ENCOUNTER — HISTORICAL (OUTPATIENT)
Dept: ADMINISTRATIVE | Facility: HOSPITAL | Age: 61
End: 2021-03-31

## 2021-03-31 LAB
BASOPHILS # BLD AUTO: 0 X10E3/UL (ref 0–0.2)
BASOPHILS NFR BLD AUTO: 1 %
DEPRECATED CALCIDIOL+CALCIFEROL SERPL-MC: 52.1 NG/ML (ref 30–100)
EOSINOPHIL # BLD AUTO: 0.1 X10E3/UL (ref 0–0.4)
EOSINOPHIL NFR BLD AUTO: 2 %
ERYTHROCYTE [DISTWIDTH] IN BLOOD BY AUTOMATED COUNT: 17.1 % (ref 11.6–15.4)
HCT VFR BLD AUTO: 41.9 % (ref 37.5–51)
HGB BLD-MCNC: 12.9 G/DL (ref 13–17.7)
LYMPHOCYTES # BLD AUTO: 1.3 X10E3/UL (ref 0.7–3.1)
LYMPHOCYTES NFR BLD AUTO: 16 %
MCH RBC QN AUTO: 31 PG (ref 26.6–33)
MCHC RBC AUTO-ENTMCNC: 30.8 G/DL (ref 31.5–35.7)
MCV RBC AUTO: 101 FL (ref 79–97)
MONOCYTES # BLD AUTO: 0.6 X10E3/UL (ref 0.1–0.9)
MONOCYTES NFR BLD AUTO: 7 %
NEUTROPHILS # BLD AUTO: 5.8 X10E3/UL (ref 1.4–7)
NEUTROPHILS NFR BLD AUTO: 74 %
PLATELET # BLD AUTO: 335 X10E3/UL (ref 150–450)
RBC # BLD AUTO: 4.16 X10(6)/MCL (ref 4.14–5.8)
TSH SERPL-ACNC: 2.68 MIU/ML (ref 0.45–4.5)
WBC # SPEC AUTO: 7.8 X10E3/UL (ref 3.4–10.8)

## 2021-04-21 LAB
BILIRUB SERPL-MCNC: NEGATIVE MG/DL
BLOOD URINE, POC: NORMAL
CLARITY, POC UA: NORMAL
COLOR, POC UA: NORMAL
GLUCOSE UR QL STRIP: NEGATIVE
KETONES UR QL STRIP: NORMAL
LEUKOCYTE EST, POC UA: NORMAL
NITRITE, POC UA: POSITIVE
PH, POC UA: 6
PROTEIN, POC: NORMAL
SPECIFIC GRAVITY, POC UA: 1.01
UROBILINOGEN, POC UA: NORMAL

## 2021-04-27 ENCOUNTER — HISTORICAL (OUTPATIENT)
Dept: ADMINISTRATIVE | Facility: HOSPITAL | Age: 61
End: 2021-04-27

## 2021-05-10 ENCOUNTER — HISTORICAL (OUTPATIENT)
Dept: ADMINISTRATIVE | Facility: HOSPITAL | Age: 61
End: 2021-05-10

## 2021-07-15 ENCOUNTER — HISTORICAL (OUTPATIENT)
Dept: ADMINISTRATIVE | Facility: HOSPITAL | Age: 61
End: 2021-07-15

## 2021-07-15 LAB
BASOPHILS # BLD AUTO: 0 X10E3/UL (ref 0–0.2)
BASOPHILS NFR BLD AUTO: 1 %
EOSINOPHIL # BLD AUTO: 0.1 X10E3/UL (ref 0–0.4)
EOSINOPHIL NFR BLD AUTO: 1 %
ERYTHROCYTE [DISTWIDTH] IN BLOOD BY AUTOMATED COUNT: 16.7 % (ref 11.6–15.4)
HCT VFR BLD AUTO: 42.8 % (ref 37.5–51)
HGB BLD-MCNC: 13.4 G/DL (ref 13–17.7)
LYMPHOCYTES # BLD AUTO: 1.1 X10E3/UL (ref 0.7–3.1)
LYMPHOCYTES NFR BLD AUTO: 14 %
MCH RBC QN AUTO: 32.8 PG (ref 26.6–33)
MCHC RBC AUTO-ENTMCNC: 31.3 G/DL (ref 31.5–35.7)
MCV RBC AUTO: 105 FL (ref 79–97)
MONOCYTES # BLD AUTO: 0.6 X10E3/UL (ref 0.1–0.9)
MONOCYTES NFR BLD AUTO: 8 %
NEUTROPHILS # BLD AUTO: 5.9 X10E3/UL (ref 1.4–7)
NEUTROPHILS NFR BLD AUTO: 76 %
PLATELET # BLD AUTO: 311 X10E3/UL (ref 150–450)
RBC # BLD AUTO: 4.08 X10(6)/MCL (ref 4.14–5.8)
WBC # SPEC AUTO: 7.7 X10E3/UL (ref 3.4–10.8)

## 2021-08-17 ENCOUNTER — HISTORICAL (OUTPATIENT)
Dept: ADMINISTRATIVE | Facility: HOSPITAL | Age: 61
End: 2021-08-17

## 2021-11-17 ENCOUNTER — HISTORICAL (OUTPATIENT)
Dept: ADMINISTRATIVE | Facility: HOSPITAL | Age: 61
End: 2021-11-17

## 2021-11-17 LAB
ALBUMIN SERPL-MCNC: 4.1 G/DL (ref 3.8–4.9)
ALBUMIN/GLOB SERPL: 1.8 {RATIO} (ref 1.2–2.2)
ALP SERPL-CCNC: 97 IU/L (ref 44–121)
ALT SERPL-CCNC: 37 IU/L (ref 0–44)
AST SERPL-CCNC: 44 IU/L (ref 0–40)
BASOPHILS # BLD AUTO: 0 X10E3/UL (ref 0–0.2)
BASOPHILS NFR BLD AUTO: 1 %
BILIRUB SERPL-MCNC: 0.4 MG/DL (ref 0–1.2)
BUN SERPL-MCNC: 17 MG/DL (ref 8–27)
CALCIUM SERPL-MCNC: 9.4 MG/DL (ref 8.6–10.2)
CHLORIDE SERPL-SCNC: 96 MMOL/L (ref 96–106)
CHOLEST SERPL-MCNC: 216 MG/DL (ref 100–199)
CHOLEST/HDLC SERPL: 5.4 RATIO (ref 0–5)
CO2 SERPL-SCNC: 29 MMOL/L (ref 20–29)
CREAT SERPL-MCNC: 1.05 MG/DL (ref 0.76–1.27)
CREAT/UREA NIT SERPL: 16 (ref 10–24)
EOSINOPHIL # BLD AUTO: 0.1 X10E3/UL (ref 0–0.4)
EOSINOPHIL NFR BLD AUTO: 2 %
ERYTHROCYTE [DISTWIDTH] IN BLOOD BY AUTOMATED COUNT: 15.3 % (ref 11.6–15.4)
GLOBULIN SER-MCNC: 2.3 G/DL (ref 1.5–4.5)
GLUCOSE SERPL-MCNC: 115 MG/DL (ref 65–99)
HBA1C MFR BLD: 7.3 % (ref 4.8–5.6)
HCT VFR BLD AUTO: 43.1 % (ref 37.5–51)
HDLC SERPL-MCNC: 40 MG/DL
HGB BLD-MCNC: 14 G/DL (ref 13–17.7)
LDLC SERPL CALC-MCNC: 132 MG/DL (ref 0–99)
LYMPHOCYTES # BLD AUTO: 1.2 X10E3/UL (ref 0.7–3.1)
LYMPHOCYTES NFR BLD AUTO: 16 %
MCH RBC QN AUTO: 32.1 PG (ref 26.6–33)
MCHC RBC AUTO-ENTMCNC: 32.5 G/DL (ref 31.5–35.7)
MCV RBC AUTO: 99 FL (ref 79–97)
MICROALBUMIN/CREAT RATIO PNL UR: 17 MG/G CREAT (ref 0–29)
MONOCYTES # BLD AUTO: 0.7 X10E3/UL (ref 0.1–0.9)
MONOCYTES NFR BLD AUTO: 9 %
NEUTROPHILS # BLD AUTO: 5.5 X10E3/UL (ref 1.4–7)
NEUTROPHILS NFR BLD AUTO: 72 %
PLATELET # BLD AUTO: 285 X10E3/UL (ref 150–450)
POTASSIUM SERPL-SCNC: 4 MMOL/L (ref 3.5–5.2)
PROT SERPL-MCNC: 6.4 G/DL (ref 6–8.5)
RBC # BLD AUTO: 4.36 X10(6)/MCL (ref 4.14–5.8)
SODIUM SERPL-SCNC: 140 MMOL/L (ref 134–144)
TRIGL SERPL-MCNC: 245 MG/DL (ref 0–149)
VLDLC SERPL CALC-MCNC: 44 MG/DL (ref 5–40)
WBC # SPEC AUTO: 7.6 X10E3/UL (ref 3.4–10.8)

## 2021-12-10 ENCOUNTER — HISTORICAL (OUTPATIENT)
Dept: RADIOLOGY | Facility: HOSPITAL | Age: 61
End: 2021-12-10

## 2021-12-21 ENCOUNTER — HISTORICAL (OUTPATIENT)
Dept: RADIOLOGY | Facility: HOSPITAL | Age: 61
End: 2021-12-21

## 2022-04-11 ENCOUNTER — HISTORICAL (OUTPATIENT)
Dept: ADMINISTRATIVE | Facility: HOSPITAL | Age: 62
End: 2022-04-11
Payer: MEDICARE

## 2022-04-28 VITALS
BODY MASS INDEX: 45.1 KG/M2 | SYSTOLIC BLOOD PRESSURE: 144 MMHG | OXYGEN SATURATION: 95 % | DIASTOLIC BLOOD PRESSURE: 85 MMHG | HEIGHT: 70 IN | WEIGHT: 315 LBS

## 2022-04-30 NOTE — OP NOTE
Patient:   Wilton Chavez            MRN: [id number]            FIN: [id number]               Age:   61 years     Sex:  Male     :  1960   Associated Diagnoses:   None   Author:   Vijay Gilliam MD      Preoperative Diagnosis:  Cataract  Left eye    Postoperative Diagnosis:  Cataract Left eye    Procedure:  Phacoemulsification with intraocular lens implantation Left eye    Surgeon:  Vijay Gilliam MD    Assistant:  Alanna Caban [facility]    Anestheisa:  MAC    Complications:  None    The patient was brought to the [facility] laser.  A time out was performed.  The capsulotomy, lens fragmentation and limbal relaxing incisions were completed.  Then the patient was brought into the operating suite, where the patient was correctly identified as was the operative eye via timeout.  The patient was prepped and draped in a sterile ophthalmic fashion.  A lid speculum was placed in the operative eye and the microscope was brought into place.  A 1.0 mm paracentesis was then made at (6) o'clock.  The anterior chamber was filled with Endocoat.  A (temporal) clear corneal incision was made with a 2.4 mm keratome blade.  A 6 mm corneal marking ring was used to hemant the cornea centered over the visual axis.  A 5.00 mm continuous curvilinear capsulorhexis was fashioned using a cystotome and microcapsular forceps.  Hydrodissection and hydrodelineation was performed with upreserved 1% Xylocaine.  The nucleus was then phacoemulsified with the Abbott phacoemulsification hand-piece with a total of (0) EFX.  The cortex was then removed with the I/A hand-piece.  The lens model (ZCB00) with a power of (19.5) was placed in the capsular bag.  The Helon was then removed from the eye with the I/A hand piece.  The anterior chamber was inflated and the wounds were hydrated with BSS.  The wounds were checked with Weck-Nitza sponges and found to be watertight.  The lid speculum was removed and topical antibiotics were placed on the  operative eye.  The patient was brought to PACU in good condition.

## 2022-04-30 NOTE — ED PROVIDER NOTES
Patient:   Wilton Chavez            MRN: [id number]            FIN: [id number]               Age:   57 years     Sex:  Male     :  1960   Associated Diagnoses:   MG with exacerbation (myasthenia gravis)   Author:   Jaret Estrada MD      Basic Information   Time seen: Date 2018, Immediately upon arrival.   History source: Patient, significant other.   Arrival mode: Private vehicle.   History limitation: None.   Additional information.   History of Present Illness   The patient presents with left, weakness, numbness and slurred speech.  The onset was 3  days ago.  The course/duration of symptoms is worsening.  Location: Left face. The character of symptoms is weakness, difficult to speak and numbness, not altered sensation.  The degree at onset was moderate.  The degree at maximum was moderate.  The degree at present is moderate.  Risk factors consist of diabetes mellitus, hypertension, obesity and smoking.  The patient's dominant hand is the right hand.  Prior episodes: frequent.  Therapy today: prescription medications including prednisone 10.  Associated symptoms: denies chest pain, denies vomiting and denies dizziness.  Baseline status: uses a cane.   Additional history: patient was in observation for myasthenia gravis on 18. patient states that the symptoms are the same as before: numbness to left side of face, slurred speech, left sided facial weakness that gets worse during the day. prednisone was increased from 5 -> 10 at last visit.        Review of Systems   Constitutional symptoms:  Weakness, fatigue, no fever, no chills, no sweats.    Skin symptoms:  No jaundice, no breakdown, no burns, no dryness, no petechiae.    Eye symptoms:  No recent vision problems, no discharge.    ENMT symptoms:  No ear pain, no sore throat, no nasal congestion.    Respiratory symptoms:  No shortness of breath, no orthopnea, no sputum production.    Cardiovascular symptoms:  No chest pain, no  palpitations, no diaphoresis.    Gastrointestinal symptoms:  Diarrhea, no abdominal pain, no constipation.    Genitourinary symptoms:  No dysuria, no discharge.    Musculoskeletal symptoms:  No Muscle pain, no Claudication.    Neurologic symptoms:  No numbness, no tingling, no weakness.    Psychiatric symptoms:  No anxiety, no sleeping problems, no substance abuse.    Endocrine symptoms:  No polydipsia, no polyphagia, no hyperglycemia.    Hematologic/Lymphatic symptoms:  Bleeding tendency negative, bruising tendency negative, no petechiae.    Allergy/immunologic symptoms:  No seasonal allergies, no food allergies, no impaired immunity.       Health Status   Allergies:    Allergic Reactions (Selected)  No Known Allergies,    Allergies (1) Active Reaction  No Known Allergies None Documented  .   Medications:  (Selected)   Inpatient Medications  Ordered  Miralax - for colonoscopy prep: 238 gm, form: Powder-Recon, Oral, Once, first dose 08/25/14 15:00:00 CDT, stop date 08/25/14 15:00:00 CDT  bisacodyl 5 mg ORAL enteric coated tablet: 30 mg, form: Tab-EC, Oral, Once, first dose 08/25/14 15:00:00 CDT, stop date 08/25/14 15:00:00 CDT  fluorescein 1 mg ophthalmic test: 1 EA, 1 mg =, form: Strip, OPTH, Once, first dose 02/02/17 11:53:00 CST, stop date 02/02/17 11:53:00 CST  magnesium citrate oral liquid: 1 bottle(s) = 300 mL, form: Soln, Oral, Once, first dose 08/25/14 15:00:00 CDT, stop date 08/25/14 15:00:00 CDT, If patient does not have kidney problems.  tetracaine 0.5% ophthalmic solution: 2 drop(s), form: Soln-Opth, OPTH, Once, first dose 02/02/17 11:53:00 CST, stop date 02/02/17 11:53:00 CST  Prescriptions  Prescribed  Actos 45 mg oral tablet: See Instructions, TAKE 1 & 1/2 TABLETS BY MOUTH DAILY, # 135 tab(s), 3 Refill(s), Pharmacy: [facility]  Easy touch test strips to check blood sugar daily. #100. 3 Refills.: Easy touch test strips to check blood sugar daily. #100. 3 Refills., See Instructions, Easy  touch test strips to check blood sugar daily. #100. 3 Refills.   Dx: Diabetes, # 100 EA, 0 Refill(s)  Glucometer strips and lancets: Glucometer strips and lancets, See Instructions, Check blood sugar everyday, # 100 units, 0 Refill(s), Pharmacy: [facility]  Lantus 100 units/mL subcutaneous solution: 30 units, Subcutaneous, Daily, # 10 mL, 5 Refill(s), Pharmacy: [facility]  Lipitor 10 mg oral tablet: 10 mg = 1 tab(s), Oral, Daily, # 90 tab(s), 3 Refill(s), Pharmacy: [facility]  NexIUM 40 mg oral delayed release capsule: 40 mg = 1 cap(s), Oral, Daily, on an empty stomach, # 90 cap(s), 3 Refill(s), Pharmacy: [facility]  Xalatan 0.005% ophthalmic solution: 1 drop(s), Eye-Both, Once a day (at bedtime), # 2.5 mL, 11 Refill(s), Pharmacy: [facility]  aspirin 81 mg oral tablet, CHEWABLE: 81 mg = 1 tab(s), Oral, Daily, # 90 tab(s), 3 Refill(s), Pharmacy: [facility]  brimonidine 0.1% ophthalmic solution: 1 drop(s), Eye-Both, TID, # 10 mL, 0 Refill(s)  busPIRone 15 mg oral tablet: 7.5 mg = 0.5 tab(s), Oral, BID, Please take 0.5 tablet two times a day., # 30 tab(s), 2 Refill(s), Pharmacy: [facility]  dorzolamide 2% ophthalmic solution: 1 drop(s), Eye-Both, TID, # 10 mL, 6 Refill(s), Pharmacy: [facility]  glipiZIDE 10 mg oral tablet: 10 mg = 1 tab(s), Oral, BID, # 60 tab(s), 3 Refill(s), Pharmacy: [facility]  losartan 100 mg oral tablet: 100 mg = 1 tab(s), Oral, Daily, # 90 tab(s), 3 Refill(s), Pharmacy: [facility]  metFORMIN 1000 mg oral tablet: 1,000 mg = 1 tab(s), Oral, BID, # 60 tab(s), 3 Refill(s), Pharmacy: [facility]  mycophenolate mofetil 250 mg oral capsule: 1,500 mg = 6 cap(s), Oral, BID, 1 hour before or 2 hours after meals, # 360 cap(s), 5 Refill(s), Pharmacy: [facility]  prednisONE 10 mg oral tablet: 10 mg = 1 tab(s), Oral, Daily, # 30 tab(s), 3  Refill(s)  Documented Medications  Documented  HYDROCODONE/ACETAMINOPHEN  T: 1 tab(s), Oral, TID.      Past Medical/ Family/ Social History   Medical history:    Active  Diabetic - cooperative patient ([id number])  HTN (hypertension) ([id number])  Hypercholesteremia ([id number])  Myasthenia gravis ([id number]), Reviewed as documented in chart.   Surgical history:    Colonoscopy on 8/28/2014 at 54 Years.  Comments:  8/28/2014 09:48 - Aryan MCCALL, Narcisa TYSON  auto-populated from documented surgical case  Tendon Injury ([id number]).  Comments:  8/11/2015 13:44 - Milligan RN, Desi R RH  Carpal tunnel ([id number]).  Comments:  8/11/2015 13:44 - Milligan RN, Desi R RW  Finger amputation, no complication ([id number]).  Comments:  8/11/2015 13:45 - Milligan RN, Desi R  , Reviewed as documented in chart.   Family history:    Sister    History is negative.  Father  Emphysema  COPD (chronic obstructive pulmonary disease).  Asthma.  Sleep apnea.  Mother  Heart disease  Brother  Diabetes mellitus type 2  Cancer  , Reviewed as documented in chart.   Social history: Alcohol use: Denies, Tobacco use: Regularly, 3 cigarettes per day, for the last 16 years, Drug use: Denies.   Problem list:    Active Problems (14)  Activity intolerance   At risk for aspiration   Diabetes mellitus   Diabetic - cooperative patient   HTN (hypertension)   Hypercholesteremia   Impaired mobility   Impaired mobility   Myasthenia gravis   Ocular hypertension   SERGE on CPAP   Tobacco user   Tobacco user   Tobacco user   .      Physical Examination               Vital Signs      Vital Signs (last 24 hrs)_____  Last Charted___________  Temp Oral     36.3 DegC  (APR 17 09:48)  Heart Rate Peripheral   H 102bpm  (APR 17 09:48)  Resp Rate         18 br/min  (APR 17 09:48)  SBP      122 mmHg  (APR 17 09:48)  DBP      63 mmHg  (APR 17 09:48)  SpO2      97 %   (APR 17 09:48)  Weight      136 kg  (APR 17 09:48)  Height      180 cm  (APR 17 09:48)  BMI      41.98  (APR 17 09:48)  .   General:  Alert, no acute distress.    Skin:  Warm, dry.    Eye:  Pupils are equal, round and reactive to light, extraocular movements are intact, normal conjunctiva.    Ears, nose, mouth and throat:  Oral mucosa moist, no pharyngeal erythema or exudate.    Cardiovascular:  Regular rate and rhythm, No murmur, Normal peripheral perfusion.    Respiratory:  Lungs are clear to auscultation, respirations are non-labored, breath sounds are equal.    Neurological:  Alert and oriented to person, place, time, and situation, No focal neurological deficit observed, normal sensory observed, Cranial nerves II - XII: Intact, Motor strength: Equal bilaterally, equal in UE and LE. slight left sided facial weakness , Speech: Slurred, Gait: Off-balance.    Psychiatric:  Cooperative, appropriate mood & affect, normal judgment.       Medical Decision Making   Differential Diagnosis:  myasthenia gravis flare up.   Results review:     No qualifying data available.      Impression and Plan   Diagnosis   MG with exacerbation (myasthenia gravis) ([id number]-DH G70.01)   Plan   Condition: Unchanged.    Disposition: Admit time  4/17/2018 11:27:00, Admit to Inpatient Unit, medicine on call, Dispositioned by: Time: 4/17/2018 11:29:00, Sean LOUIS, Jaret.    Notes: Discussed with PA student Ashley King..

## 2022-04-30 NOTE — DISCHARGE SUMMARY
Patient:   Wilton Chavez            MRN: [id number]            FIN: [id number]               Age:   57 years     Sex:  Male     :  1960   Associated Diagnoses:   MG with exacerbation (myasthenia gravis)   Author:   Tangela Huber MD      Discharge Information      Discharge Summary Information   Admitted  2018   Discharged  2018   Admitting physician     Regina Vasquez MD.     Referring physician for admission     Jaret Estrada MD.     Consulting physician     Adama Caceres MD     Discharge diagnosis     MG with exacerbation (myasthenia gravis) ([id number]-BD G70.01).     Discharge medications     OTHER MEDICATIONS (Selected)   Prescriptions  Prescribed  mycophenolate mofetil 250 mg oral capsule: 1,500 mg = 6 cap(s), Oral, BID, 1 hour before or 2 hours after meals, # 360 cap(s), 5 Refill(s), Pharmacy: [facility]  prednisONE 10 mg oral tablet: 10 mg = 1 tab(s), Oral, Daily, # 30 tab(s), 3 Refill(s).       To continue all other medications as previously prescribed.   IMP: Patient to take Prednisone 10mg daily instead of 5mg. To be adjusted as per neurlogist discrection.       Physical Examination      Vital Signs (last 24 hrs)_____  Last Charted___________  Temp Oral     36.1 DegC  ( 08:00)  Heart Rate Peripheral   79 bpm  ( 08:)  Resp Rate         16 br/min  (:)  SBP      135 mmHg  (:)  DBP      86 mmHg  (:)  SpO2      94 %  (:)  Weight      141 kg  (:)  Height      177 cm  (:)  BMI      45.01  (:)       Gen: NAD, +obese  HEENT: NCAT, EOMI, moist oral mucosa, no LAD, no neck masses  Resp: CTAB. Normal inspiratory effort. No cough. No hemoptysis, no crackles, wheezing.  CVS: S1, S2, regular. no murmur. 2+ pedal pulses, no edema.  Abd: NT, ND, soft, no masses. BS2+ all quadrants  MSK: NROM, Left middle digit amputated  Skin: no rash, no skin changes  Neuro: AAOx3, CNII-VII  intact. No focal deficits. Sensation intact. Strength 5 out of 5 bilateral upper and lower extremities. Reflexes 2+ bilaterally elbow, patellar. No dysmetria. No gait ataxia. Uses cane for ambulation.   Labs Last 24 Hours   Chemistry Hematology/Coagulation   Sodium Lvl: 140 mmol/L (04/12/18 06:38:37) WBC: 12.2 x10(3)/mcL High (04/12/18 06:43:51)   Potassium Lvl: 3.8 mmol/L (04/12/18 06:38:37) RBC: 4.22 x10(6)/mcL Low (04/12/18 06:43:51)   Chloride: 103 mmol/L (04/12/18 06:38:37) Hgb: 8.9 gm/dL Low (04/12/18 06:43:51)   CO2: 28 mmol/L (04/12/18 06:38:37) Hct: 30.9 % Low (04/12/18 06:43:51)   Calcium Lvl: 8.6 mg/dL (04/12/18 06:38:37) Platelet: 414 x10(3)/mcL High (04/12/18 06:43:51)   Glucose Lvl: 199 mg/dL High (04/12/18 06:38:37) MCV: 73.2 fL Low (04/12/18 06:43:51)   BUN: 13 mg/dL (04/12/18 06:38:37) MCH: 21.1 pg Low (04/12/18 06:43:51)   Creatinine: 0.7 mg/dL (04/12/18 06:38:37) MCHC: 28.8 gm/dL Low (04/12/18 06:43:51)   eGFR-AA: >105 (04/12/18 06:38:38) RDW: 17.5 % High (04/12/18 06:43:51)   eGFR-SHIVANI: >105 (04/12/18 06:38:39) MPV: 9.8 fL (04/12/18 06:43:51)   Bili Total: 0.2 mg/dL (04/12/18 06:38:37) Abs Neut: 10.34 x10(3)/mcL High (04/12/18 06:43:51)   Bili Direct: <0.1 (04/12/18 06:38:37) Neutro Auto: 85 x10(3)/mcL (04/12/18 06:43:52)   Bili Indirect: calc invalid (04/12/18 06:38:37) Lymph Auto: 10 % Low (04/12/18 06:43:52)   AST: 14 unit/L Low (04/12/18 06:38:37) Mono Auto: 4 % (04/12/18 06:43:52)   ALT: 17 unit/L (04/12/18 06:38:37) Basophil Auto: 0 (04/12/18 06:43:52)   Alk Phos: 101 unit/L (04/12/18 06:38:37) Abs Neutro: 10.34 x10(3)/mcL (04/12/18 06:43:52)   Total Protein: 6.7 gm/dL (04/12/18 06:38:37) Abs Lymph: 1.16 x10(3)/mcL (04/12/18 06:43:52)   Albumin Lvl: 3.3 gm/dL Low (04/12/18 06:38:37) Abs Mono: 0.48 x10(3)/mcL (04/12/18 06:43:52)   Globulin: 3.4 gm/mL (04/12/18 06:38:37) Abs Baso: 0.02 x10(3)/mcL (04/12/18 06:43:52)   A/G Ratio: 1 ratio (04/12/18 06:38:37) IG%: 1 % (04/12/18 06:43:52)    IG#:  0.16 (04/12/18 06:43:52)     Accession: [id number]  Order: US Carotid  Report Dt/Tm: 04/12/2018 12:56  Report:      History.   Cerebral occlusion     Reference.   None available.     Technique.   Real-time images, spectral and color pulsed doppler were performed of  both carotid bifurcations and of both vertebral arteries.  NASCET  criteria utilized.     Findings.  There is no significant plaquing of the carotid systems bilaterally.     Antegrade flow left vertebral artery. Right vertebral artery not  convincingly seen.     Velocities.  Right CCA PSV equals 103 cm/sec  Right ICA PSV equals 89 cm/sec  Right ICA/CCA PSV ratio equals 0.9.     Left CCA PSV equals 97 cm/sec  Left ICA PSV equals 124 cm/sec.  Left ICA/CCA PSV ratio equals 1.3.     Impression:  1. By velocity criteria are around 50% left internal carotid stenosis.  2. Less than 50% right internal carotid stenosis.  3. Right vertebral artery not visualized. Antegrade flow left  vertebral artery.         Hospital Course   Hospital Course   Admitted from: from emergency department.        Admit 04/11/2018; Disch 04/12/2018  Admit Dx: Exacerbation of MG; Disch Dx: same  Service: [facility] Team 4  Consults: Neuro (04/11/2018)  Procedures: none  H&P: Patient is a 57-year-old  male with past medical history of hypertension, hyperlipidemia, SERGE on CPAP, diabetes mellitus type II, carotid artery stenosis, myasthenia gravis (diagnosed 2014) presents to the ED with weakness. Patient was in his usual state of health, when at 9 AM on the morning of presentation he experienced drooping of his left eye, drooping of the left side of his face, numbness of his left cheek and slurring of speech for two hours. Symptoms were witnessed by his wife, who is present at bedside. No hx of strokes in the past, and he denies having ever had these symptoms before. He denies headache, visual changes, nausea, vomiting, chest pain, shortness of breath, sore throat, cough, abdominal  pain, diarrhea, loss of sensation or strength of his extremities bilaterally. He denies any acute ataxia, incontinence. Denies dizzines, syncope, trauma. Denies fevers, chills.  Patient is compliant with all home medications. He is not on home oxygen. He has not had a thymectomy and has never been prescribed pyridostigmine.  In Novenmber 2017 his prednisone was tapered from 10mg qday to 5mg qday. Medicine was consulted for admission of acute myasthenia gravis exacerbation.  Physical exam (per ED physician) was notable for left sided ptosis, without focal neurological findings.  CNII-XII were noted to be intact bilaterally with normal sensory, motor, and speech findings.    Course: Patient was given IV Solumedrol 125mg x1 while in the ED with relief of his symptoms.  CT of his head was negative for any acute intracranial process.  Patient was admitted for overnight observation. Carotid U/S was scheduled, given history of stenosis and risk factors- unremarkable; b/l stenosis ~50%; not requiring intervention at present time. Neurology was consulted and recommended IV Solumedrol 60mg next day; they also recommended increasing his home Prednisone to 10mg qday.  Did not recommend high-dose steroids and IVIG at present time.   Discharge Condition: stable; no acute events or neurological changes overnight.   Meds: Increase Prednisone to 10mg qday; continue other home medications.  No refills required.  Instructions: Activity and diet as tolerated.  Return to ED if symptoms return    F/u: Patient to follow up with Neurology in 1-2 weeks;  post ashby visit in IM clinic         Discharge Plan   Discharge Summary Plan   Discharge Status: improved.     Discharge instructions given: to patient.     Discharge disposition: discharge to home.     Prescriptions: continue same medications, reviewed with patient, written and given to patient.     Education and Follow-up   Counseled: patient.     Discharge Planning: Hyperglycemia,  Easy-to-Read, See PCP Dr Sarmiento as previously scheduled; f/u with Neurology in 1-2 weeks; f/u with IM clinic in 1-2 weeks.

## 2022-04-30 NOTE — OP NOTE
Patient:   Wilton Chavez            MRN: [id number]            FIN: [id number]               Age:   60 years     Sex:  Male     :  1960   Associated Diagnoses:   None   Author:   Vijay Gilliam MD      Preoperative Diagnosis:  Cataract Right eye    Postoperative Diagnosis:  Cataract Right eye    Procedure:  Phacoemulsification with intraocular lens implantation Right eye    Surgeon:  Vijay Gilliam MD    Assistant:  Alanna Caban [facility]    Anestheisa:  MAC    Complications:  None    The patient was brought to the [facility] laser.  A time out was performed.  The capsulotomy, lens fragmentation and limbal relaxing incisions were completed.  Then the patient was brought into the operating suite, where the patient was correctly identified as was the operative eye via timeout.  The patient was prepped and draped in a sterile ophthalmic fashion.  A lid speculum was placed in the operative eye and the microscope was brought into place.  A 1.0 mm paracentesis was then made at (12) o'clock.  The anterior chamber was filled with Endocoat.  A (temporal) clear corneal incision was made with a 2.4 mm keratome blade.  A 6 mm corneal marking ring was used to hemant the cornea centered over the visual axis.  A 5.00 mm continuous curvilinear capsulorhexis was fashioned using a cystotome and microcapsular forceps.  Hydrodissection and hydrodelineation was performed with upreserved 1% Xylocaine.  The nucleus was then phacoemulsified with the Abbott phacoemulsification hand-piece with a total of (1) EFX.  The cortex was then removed with the I/A hand-piece.  The lens model (ZCB00) with a power of (20.0) was placed in the capsular bag.  The Helon was then removed from the eye with the I/A hand piece.  The anterior chamber was inflated and the wounds were hydrated with BSS.  The wounds were checked with Weck-Nitza sponges and found to be watertight.  The lid speculum was removed and topical antibiotics were placed on the  operative eye.  The patient was brought to PACU in good condition.

## 2022-04-30 NOTE — ED PROVIDER NOTES
Patient:   Wilton Chavez            MRN: [id number]            FIN: [id number]               Age:   57 years     Sex:  Male     :  1960   Associated Diagnoses:   MG with exacerbation (myasthenia gravis)   Author:   Jaret Estrada MD      Basic Information   Time seen: Date & time 2018 11:48:00.   History source: Patient.   Arrival mode: Private vehicle.   History limitation: None.   Additional information: Chief Complaint from Nursing Triage Note : Chief Complaint   2018 11:32 CDT      Chief Complaint           PT W CO WEAKNESS  W SLOWED SPEECH SINCE HE WOKE UP AT 6AM, REPORTS HX OF MYSTHENIA GRAVIS.  .      History of Present Illness   The patient presents with 57-year-old male with a history of myasthenia gravis presents with weakness, fatigue, and change in speech starting this morning.  Patient denies focal motor weakness.  Patient states she's been compliant with his CellCept and prednisone.  Patient believes he is starting a flare.  No other complaints..  The course/duration of symptoms is constant.  The character of symptoms is generalized.  The degree at present is minimal.  Risk factors consist of diabetes mellitus, obesity, age and Myasthenia gravis.  Prior episodes: occasional.  Therapy today: see nurses notes.  Associated symptoms: none.        Review of Systems   Constitutional symptoms:  Weakness, fatigue.    Skin symptoms:  Negative except as documented in HPI.   Eye symptoms:  Negative except as documented in HPI.   ENMT symptoms:  Negative except as documented in HPI.   Respiratory symptoms:  Negative except as documented in HPI.   Cardiovascular symptoms:  Negative except as documented in HPI.   Gastrointestinal symptoms:  Negative except as documented in HPI.   Genitourinary symptoms:  Negative except as documented in HPI.   Musculoskeletal symptoms:  Negative except as documented in HPI.   Neurologic symptoms:  Negative except as documented in HPI.   Psychiatric  symptoms:  Negative except as documented in HPI.   Endocrine symptoms:  Negative except as documented in HPI.   Hematologic/Lymphatic symptoms:  Negative except as documented in HPI.   Allergy/immunologic symptoms:  Negative except as documented in HPI.      Health Status   Allergies:    Allergic Reactions (Selected)  No Known Allergies,    Allergies (1) Active Reaction  No Known Allergies None Documented  .      Past Medical/ Family/ Social History   Medical history:    Active  Diabetic - cooperative patient ([id number])  HTN (hypertension) ([id number])  Hypercholesteremia ([id number])  Myasthenia gravis ([id number]), Reviewed as documented in chart.   Surgical history:    Colonoscopy on 8/28/2014 at 54 Years.  Comments:  8/28/2014 09:48 - Aryan MCCALL, Narcisa TYSON  auto-populated from documented surgical case  Tendon Injury ([id number]).  Comments:  8/11/2015 13:44 - Milligan RN, Desi R RH  Carpal tunnel ([id number]).  Comments:  8/11/2015 13:44 - Milligan RN, Desi R RW  Finger amputation, no complication ([id number]).  Comments:  8/11/2015 13:45 - Milligan RN, Desi R  , Reviewed as documented in chart.   Family history:    Emphysema  Father  Diabetes mellitus type 2  Brother  Heart disease  Mother  Cancer  Brother  Asthma.  Father  Sleep apnea.  Father  COPD (chronic obstructive pulmonary disease).  Father  , Reviewed as documented in chart.   Social history: Reviewed as documented in chart, Family/social situation: Intact family.   Problem list:    Active Problems (14)  Activity intolerance   At risk for aspiration   Diabetes mellitus   Diabetic - cooperative patient   HTN (hypertension)   Hypercholesteremia   Impaired mobility   Impaired mobility   Myasthenia gravis   Ocular hypertension   SERGE on CPAP   Tobacco user   Tobacco user   Tobacco user   .      Physical Examination               Vital Signs      Vital  Signs (last 24 hrs)_____  Last Charted___________  Temp Oral     36.6 DegC  (APR 11 11:32)  Heart Rate Peripheral   81 bpm  (APR 11 11:32)  Resp Rate         18 br/min  (APR 11 11:32)  SBP      138 mmHg  (APR 11 11:32)  DBP      L 16mmHg  (APR 11 11:32)  SpO2      98 %  (APR 11 11:32)  Weight      141 kg  (APR 11 11:32)  Height      177 cm  (APR 11 11:32)  BMI      45.01  (APR 11 11:32)  .   General:  Alert, no acute distress.    Skin:  Warm, dry.    Head:  Normocephalic.   Neck:  Supple.   Eye:  Extraocular movements are intact, normal conjunctiva, Left ptosis noted.    Ears, nose, mouth and throat:  Oral mucosa moist.   Cardiovascular:  Regular rate and rhythm.   Respiratory:  Lungs are clear to auscultation, respirations are non-labored.    Chest wall:  No tenderness.   Back:  Nontender.   Musculoskeletal:  Normal ROM, no tenderness, no swelling, no deformity.    Gastrointestinal:  Soft, Nontender.    Neurological:  Alert and oriented to person, place, time, and situation, No focal neurological deficit observed, CN II-XII intact, normal sensory observed, normal motor observed, normal speech observed, normal coordination observed.    Lymphatics:  No lymphadenopathy.   Psychiatric:  Cooperative.      Medical Decision Making   Documents reviewed:  Emergency department nurses' notes, emergency department records, prior records.    Results review:  Lab results : Lab View   4/11/2018 12:20 CDT      Sample ABG                arterial                             Treatment                 room air                             Site                      Radial Rt                             pH Art                    7.39                             pCO2 Art                  47.0 mmHg  HI                             pO2 Art                   68.0 mmHg  LOW                             HCO3 Art                  28.5 mmol/L  HI                             CO2 Totl Art              29.9 mmol/L  HI                             O2  Sat Art                94.8 %  LOW                             D base                    3.0  HI                             THB ABG                   9.7 gm/dL  LOW                             CO Hgb                    6.1 %  HI                             Met Hgb Art               0.1 %                             O2 Hgb                    88.9 %  LOW                             Allens                    Positive    4/11/2018 12:15 CDT      Sodium Lvl                143 mmol/L                             Potassium Lvl             4.6 mmol/L                             Chloride                  106 mmol/L                             CO2                       31 mmol/L                             Calcium Lvl               8.7 mg/dL                             Glucose Lvl               129 mg/dL  HI                             BUN                       11 mg/dL                             Creatinine                0.90 mg/dL                             eGFR-AA                   >105 mL/min                             eGFR-SHIVANI                  93 mL/min                             Bili Total                0.3 mg/dL                             Bili Direct               0.1 mg/dL                             Bili Indirect             0.2 mg/dL                             AST                       12 unit/L  LOW                             ALT                       19 unit/L                             Alk Phos                  115 unit/L                             Total Protein             7.2 gm/dL                             Albumin Lvl               3.6 gm/dL                             Globulin                  3.60 gm/mL  HI                             A/G Ratio                 1 ratio                             WBC                       9.9 x10(3)/mcL                             RBC                       4.57 x10(6)/mcL                             Hgb                       9.5 gm/dL  LOW                              Hct                       34.2 %  LOW                             Platelet                  415 x10(3)/mcL  HI                             MCV                       74.8 fL  LOW                             MCH                       20.8 pg  LOW                             MCHC                      27.8 gm/dL  LOW                             RDW                       17.9 %  HI                             MPV                       9.6 fL                             Abs Neut                  7.31 x10(3)/mcL                             Neutro Auto               74 x10(3)/mcL  NA                             Lymph Auto                16 %                             Mono Auto                 7 %                             Eos Auto                  2  NA                             Abs Eos                   0.16  NA                             Basophil Auto             1  NA                             Abs Neutro                7.31 x10(3)/mcL  NA                             Abs Lymph                 1.60 x10(3)/mcL  NA                             Abs Mono                  0.71 x10(3)/mcL  NA                             Abs Baso                  0.06 x10(3)/mcL  NA                             IG%                       1 %  NA                             IG#                       0.0700  NA    4/11/2018 12:11 CDT      UA Appear                 Clear                             UA Color                  YELLOW                             UA Spec Grav              1.028                             UA Bili                   Negative                             UA pH                     6.0                             UA Urobilinogen           2 mg/dL                             UA Blood                  Negative                             UA Glucose                Normal                             UA Ketones                Trace                             UA Protein                30 mg/dL                              UA Nitrite                Negative                             UA Leuk Est               75 Burke/uL                             UA WBC Interp             3-5 /HPF                             UA RBC Interp             0-2                             UA Bact Interp            None Seen                             UA Squam Epi Interp                                    UA Hyal Cast Interp       3-5  ,    No qualifying data available.    Radiology results:  Rad Results (ST)  < 12 hrs   Accession: [id number]  Order: CT Head W/O Contrast  Report Dt/Tm: 04/11/2018 14:01  Report:   Clinical History:  Cerebral occlusion     Technique:  Axial CT of the brain were obtained without contrast. There are  osseous reconstructed images available for review with coronal and  sagittal reconstructions.     Automatic exposure control was utilized to reduce the patient's  radiation dose.     Comparison:  February 2, 2017     Findings:  No acute intracranial hemorrhage.  Diffuse cerebral atrophy with concordant ventricular enlargement.   There is normal gray white differentiation.   The osseous structures are normal.   The mastoid air cells are clear.   Debris within the left auditory canal.  The globes and orbital contents are normal bilaterally.  The visualized maxillary, ethmoid and sphenoid sinuses are clear.     Impression  No acute intracranial hemorrhage. Findings of chronic microvascular  ischemic disease.      Accession: [id number]  Order: XR Chest 2 Views  Report Dt/Tm: 04/11/2018 13:28  Report:      History:  Hypertension     Reference:  28 August 2017     Findings:  Frontal and lateral views of the chest were obtained. Heart is mildly  enlarged. The lungs are clear. No pneumothorax or significant  effusion.     Impression:   No acute cardiopulmonary abnormality.      .      Reexamination/ Reevaluation   Course: progressing as expected.   Assessment: exam improved.      Impression and Plan   Diagnosis   MG with  exacerbation (myasthenia gravis) (OLU12-XB G70.01)      Calls-Consults   -  4/11/2018 14:21:00 , IM on call.    Plan   Condition: Stable.    Disposition: Admit time  4/11/2018 14:22:00, Place in Observation Telemetry Unit, Dispositioned by: Time: 4/11/2018 12:46:00, Jaret Estrada MD.    Counseled: Patient, Regarding diagnosis, Regarding diagnostic results, Regarding treatment plan.

## 2022-04-30 NOTE — DISCHARGE SUMMARY
Patient:   Wilton Chavez            MRN: [id number]            FIN: [id number]               Age:   57 years     Sex:  Male     :  1960   Associated Diagnoses:   MG with exacerbation (myasthenia gravis)   Author:   Tangela Huber MD      Discharge Information      Discharge Summary Information   Admitted  2018   Discharged  2018   Admitting physician     Ramírez Vasquez MDth.     Discharge diagnosis     MG with exacerbation (myasthenia gravis) ([id number] G70.01).     Discharge medications     OTHER MEDICATIONS (Selected)   Prescriptions  Prescribed  aspirin 81 mg oral tablet, CHEWABLE: 81 mg = 1 tab(s), Oral, Daily, # 90 tab(s), 3 Refill(s), Pharmacy: [facility]  mycophenolate mofetil 250 mg oral capsule: 1,500 mg = 6 cap(s), Oral, BID, 1 hour before or 2 hours after meals, # 360 cap(s), 5 Refill(s), Pharmacy: [facility]  prednisONE 20 mg oral tablet: 20 mg = 1 tab(s), Oral, Daily, X 30 day(s), # 30 tab(s), 6 Refill(s).     To continue all other medications as previously prescribed.       Physical Examination      Vital Signs (last 24 hrs)_____  Last Charted___________  Temp Oral     36.9 DegC  ( 04:00)  Resp Rate         20 br/min  (:)  SBP      H 141mmHg  (:)  DBP      87 mmHg  (:)  SpO2      99 %  (:)      Gen: NAD; sitting upright in bed, comfortable. +obese  HEENT: NCAT, EOMI, moist oral mucosa, no LAD, no neck masses  Resp: CTAB. Normal inspiratory effort. No cough. No hemoptysis, no crackles, wheezing.  CVS: S1, S2, regular. no murmur. 2+ pedal pulses, no edema.  Abd: NT, ND, soft, no masses. BS2+ all quadrants  MSK: NROM, no deformity; amputated left middle digit.  Skin: no rash, no skin changes  Neuro: AAOx3, CNII-VII intact. no focal deficits of extremities b/l. No speech changes. Left sided facial drooping resolved. Uses cane for ambulation.     Labs Last 24 Hours   Chemistry Hematology/Coagulation    Sodium Lvl: 140 mmol/L (04/19/18 05:55:03) WBC: 6.5 x10(3)/mcL (04/19/18 05:22:36)   Potassium Lvl: 3.3 mmol/L Low (04/19/18 05:55:03) RBC: 4.08 x10(6)/mcL Low (04/19/18 05:22:36)   Chloride: 105 mmol/L (04/19/18 05:55:03) Hgb: 8.4 gm/dL Low (04/19/18 05:22:36)   CO2: 30 mmol/L (04/19/18 05:55:03) Hct: 30.1 % Low (04/19/18 05:22:36)   Calcium Lvl: 8.5 mg/dL (04/19/18 05:55:03) Platelet: 385 x10(3)/mcL (04/19/18 05:22:36)   Glucose Lvl: 114 mg/dL High (04/19/18 05:55:03) MCV: 73.8 fL Low (04/19/18 05:22:36)   BUN: 12 mg/dL (04/19/18 05:55:03) MCH: 20.6 pg Low (04/19/18 05:22:36)   Creatinine: 0.8 mg/dL (04/19/18 05:55:03) MCHC: 27.9 gm/dL Low (04/19/18 05:22:36)   eGFR-AA: >105 (04/19/18 05:55:04) RDW: 17.3 % High (04/19/18 05:22:36)   eGFR-SHIVANI: >105 (04/19/18 05:55:05) MPV: 10.1 fL (04/19/18 05:22:36)   Bili Total: 0.2 mg/dL (04/19/18 05:55:03) Abs Neut: 4.4 x10(3)/mcL (04/19/18 05:22:36)   Bili Direct: <0.1 (04/19/18 05:55:03) Neutro Auto: 68 x10(3)/mcL (04/19/18 05:22:37)   Bili Indirect: unable to calc (04/19/18 05:55:03) Lymph Auto: 22 % (04/19/18 05:22:37)   AST: 16 unit/L (04/19/18 05:55:03) Mono Auto: 8 % (04/19/18 05:22:37)   ALT: 22 unit/L (04/19/18 05:55:03) Eos Auto: 2 (04/19/18 05:22:37)   Alk Phos: 77 unit/L (04/19/18 05:55:03) Abs Eos: 0.11 x10(3)/mcL (04/19/18 05:22:37)   Total Protein: 9 gm/dL High (04/19/18 05:55:03) Basophil Auto: 0 (04/19/18 05:22:37)   Albumin Lvl: 2.9 gm/dL Low (04/19/18 05:55:03) Abs Neutro: 4.4 x10(3)/mcL (04/19/18 05:22:37)   Globulin: 6.1 gm/mL High (04/19/18 05:55:03) Abs Lymph: 1.43 x10(3)/mcL (04/19/18 05:22:37)   A/G Ratio: 0 ratio Low (04/19/18 05:55:03) Abs Mono: 0.51 x10(3)/mcL (04/19/18 05:22:37)    Abs Baso: 0.03 x10(3)/mcL (04/19/18 05:22:37)    IG%: 1 % (04/19/18 05:22:37)    IG#: 0.04 (04/19/18 05:22:37)       Accession: [id number]  Order: XR Chest 2 Views  Report Dt/Tm: 04/18/2018 11:39  Report:   Clinical History  Shortness of breath     Technique  2 views  of the chest.     Comparison  April 11, 2018     Findings  Lungs are clear with no visualized focal airspace opacity.  The trachea appears midline.  The cardiomediastinal silhouette is prominent.  There is no evidence of pneumothorax or pleural effusion.  Visualized abdomen, soft tissues, and osseous structures are  unremarkable.     Impression  No acute cardiopulmonary process.      Accession: [id number]  Order: CT Abdomen and Pelvis W W/O Contrast  Report Dt/Tm: 04/17/2018 13:52  Report:   INDICATION: Abdominal Pain  COMPARISON:  Abdominopelvic CT 8/27/2017    IMPRESSION:      1. No acute inflammatory abdominopelvic pathology.     2. Minimal colonic diverticulosis. No diverticulitis.           Hospital Course   Hospital Course   Admitted from: from emergency department.       Patient is a 57-year-old  male with past medical history of hypertension, hyperlipidemia, SERGE on CPAP, diabetes mellitus type II, carotid artery stenosis, myasthenia gravis (diagnosed 2014) presents to the ED with facial weakness, slurring of speech of 3 days duration. Patient was recently d/c on 4/12 after being admitted for similar symptoms on 4/11 with complete resolution on day of d/c. Neurological work-up at last visit was unremarkable: CT of his head was negative for any acute intracranial process. Carotid U/S with b/l stenosis ~50%. Patient did not receive high-dose steroids or IVIG at last visit.  No hx of previous strokes. Not on home oxygenation. Uses CPAP at night for SERGE.  He has not had a thymectomy and has never been prescribed pyridostigmine. Home medications for MG are prednisone 10mg and mycophenolate mofetil 1500mg BID.    This admission; patient experienced speech changes, numbness and drooping of left side of his face for 3 days. He was taking 10mg of Prednisone prior to this (had been tapered down). He denies headache, visual changes, nausea, vomiting, chest pain, sore throat, cough, dysuria. He endorses  diarrhea that began last night; total of 5 episodes of non bloody diarrhea episodes. States he had a jambalaya with sausage for dinner last night. Does not report any blood in his stool recently. Hospitalized in august for salmonella enteritis with respiratory compromise 2/2 MG exacerbation. No vomiting, nausea or abdominal cramping. No fever or chills. Stool studies were conducted- results wnl. Diarrhea resolved on Day1- did not recur. No antibiotics were initiated this admission.   Patient reports that he feels his MG is exacerbated when his steroids are tapered down to a dose <20mg. He had no cardiopulmonary symptoms throughout hospital stay. He endorsed no further neurological symptoms. Respiratory status was wnl through-out hosptial stay. Continued to use CPAP at night for SERGE. We admitted patient and started IVIG 500mg/kg x2 days with increase in prednisone to 20mg daily. We continued Cellcept; all other home medications as indicated. VSS, HDS throughout. Ambulated with ease. Tolerating p.o diet with no issues.   Neurology was consulted for recommendations. Agree with discharge home today with IVIG outpatient.  Medications: IVIG 500mg/kg qWeekly x6 weeks; continue Cellcept 1500mg BID; Prednisone 20mg daily.   All other medications as previously prescribed.   FOLLOW-UP: Neurology clinic on May 12th with previously scheduled appointment.   IM post-ashby clinic appointment on April 25th.  IM Clinic with Dr Huber as PCP.   Counselled on medication adverse effects including hyperglycemia and stress ulcers. Adding protonix 40mg for ppx.   Activity as tolerated. Patient verbalizes understanding and agrees with plan.          Discharge Plan   Discharge Summary Plan   Discharge Status: improved.     Discharge disposition: discharge to home.     Prescriptions: continue same medications, reviewed (with patient, with spouse), written and given to patient.     Education and Follow-up   Counseled: patient, family, regarding  diagnosis, regarding treatment, regarding medications.     Discharge Planning: Hyperglycemia, Easy-to-Read, F/u with neurology in already scheduled appoitment; OK to overbook in Dr Huber's IM Clinic.; Anytime the conditions worsen, return to clinic or go to ED; Tangela Huber Within 2 to 4 weeks.

## 2022-05-04 NOTE — HISTORICAL [facility] CERNER
This is a historical note converted from [place]. Formatting and pictures may have been removed.  Please reference [place] for original formatting and attached multimedia. Chief Complaint  PT W CO WEAKNESS W SLOWED SPEECH SINCE HE WOKE UP AT 6AM, REPORTS HX OF MYSTHENIA GRAVIS.  History of Present Illness  Patient is a 57-year-old  male with past medical history of hypertension, hyperlipidemia,?SERGE on CPAP,?diabetes mellitus type II,?myasthenia gravis (diagnosed 2014) presents to the ED with?weakness.? Patient was in his usual state of health, when at approximately 9 AM this morning he experienced drooping of his left eye, drooping of the left side, numbness of his left cheek and slurring of speech. ?Symptoms were witnessed by his wife, who is present at bedside. ?Patient was brought to the ED where his symptoms persisted for a total of 2 hours. Patient was given IV Solumedrol 125mg in the ED?with relief of symptoms. ?CT head was done in the ED- negative for any acute intracranial processes. ?Patient denies any previous episodes of similar facial weakness.?No hx of strokes in the past. ?He denies headache, visual changes,?nausea, vomiting,?chest pain, shortness of breath, sore?throat, cough, abdominal pain, diarrhea, loss of sensation or strength of his extremities bilaterally. ?He denies any acute ataxia, incontinence. Denies dizzines, syncope, trauma. Denies fevers, chills.  He was told he has atherosclerosis of his?carotids bilaterally- did not require any intervention or CEA. ?Patient is compliant with all home medications.  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 He is not on home oxygen.?No hx of cardiopulmonary disease. No hx of intubations.  He has not had a thymectomy. ?He was never prescribed pyridostigmine in the past.  He is followed by neurology clinic, in November  2017 his prednisone was tapered down from 10 mg to 5 mg daily. ?Patient also takes mycophenolate?mofetil.  Medicine was consulted for admission of acute myasthenia gravis?exacerbation.  ?  Social: 40pk year smoker, active; no alcohol intake, no illicit drug use.?  FHx: parents with HTN, DMII  Surgeries: carpal tunnel repair on Right hand; amputation of Left 3rd digit  All: NKDA  ?  Review of Systems  Constitutional: No fever, chills, + facial weakness, no fatigue  HENMT:?No vision changes, no hearing loss, sneezing, congestion, runny nose or sore throat.  Respiratory: No shortness of breath, no cough or sputum.  Cardiovascular: No chest pain, chest discomfort. No palpitations or edema.?  Gastrointestinal: No?nausea, vomiting or diarrhea. No constipation. No abdominal pain or blood.  Genitourinary: No burning on urination, no frequency.  Musculoskeletal: No muscle, back pain, joint pain or stiffness.  Integumentary: No rash or itching.  Neurologic: No headache, dizziness, syncope, paralysis or loss of sensation., +slurring of speech, + drooping of left face  Physical Exam  Vitals & Measurements  T:?36.6? ?C (Oral)? HR:?86(Peripheral)? RR:?17? BP:?139/80? SpO2:?95%? WT:?141?kg? WT:?141?kg?  Gen: NAD, +obese  HEENT: NCAT, EOMI, moist oral mucosa,?no?LAD,?no neck masses  Resp: CTAB. Normal inspiratory effort. No cough. No hemoptysis, no crackles, wheezing.  CVS: S1, S2, regular. no murmur. 2+ pedal pulses, no edema.  Abd: NT, ND, soft, no masses. BS2+ all quadrants  MSK: NROM, Left middle digit amputated  Skin: no rash, no skin changes  Neuro: AAOx3, CNII-VII intact.? No focal deficits. ?Sensation intact. ?Strength 5 out of 5 bilateral upper and lower extremities.? Reflexes 2+ bilaterally?elbow, patellar. No dysmetria. No gait ataxia. Uses cane for ambulation.  ?   Labs Last 24 Hours?  ?Chemistry Hematology/Coagulation Blood Gases   Sodium Lvl: 143 mmol/L (04/11/18 12:53:30) WBC: 9.9 x10(3)/mcL (04/11/18  12:34:45) Sample ABG: arterial (04/11/18 12:38:49)   Potassium Lvl: 4.6 mmol/L (04/11/18 12:53:30) RBC: 4.57 x10(6)/mcL (04/11/18 12:34:45) Treatment: room air (04/11/18 12:38:49)   Chloride: 106 mmol/L (04/11/18 12:53:30) Hgb:?9.5 gm/dL?Low (04/11/18 12:34:45) Site: Radial Rt (04/11/18 12:38:49)   CO2: 31 mmol/L (04/11/18 12:53:30) Hct:?34.2 %?Low (04/11/18 12:34:45) pH Art: 7.39 (04/11/18 12:38:49)   Calcium Lvl: 8.7 mg/dL (04/11/18 12:53:30) Platelet:?415 x10(3)/mcL?High (04/11/18 12:34:45) pCO2 Art:?47 mmHg?High (04/11/18 12:38:49)   Glucose Lvl:?129 mg/dL?High (04/11/18 12:53:30) MCV:?74.8 fL?Low (04/11/18 12:34:45) pO2 Art:?68 mmHg?Low (04/11/18 12:38:49)   BUN: 11 mg/dL (04/11/18 12:53:30) MCH:?20.8 pg?Low (04/11/18 12:34:45) HCO3 Art:?28.5 mmol/L?High (04/11/18 12:38:49)   Creatinine: 0.9 mg/dL (04/11/18 12:53:30) MCHC:?27.8 gm/dL?Low (04/11/18 12:34:45) CO2 Totl Art:?29.9 mmol/L?High (04/11/18 12:38:49)   eGFR-AA: >105 (04/11/18 12:53:31) RDW:?17.9 %?High (04/11/18 12:34:45) O2 Sat Art:?94.8 %?Low (04/11/18 12:38:49)   eGFR-SHIVANI: 93 mL/min (04/11/18 12:53:32) MPV: 9.6 fL (04/11/18 12:34:45) D base:?3?High (04/11/18 12:38:49)   Bili Total: 0.3 mg/dL (04/11/18 12:53:30) Abs Neut: 7.31 x10(3)/mcL (04/11/18 12:34:45) THB ABG:?9.7 gm/dL?Low (04/11/18 12:38:49)   Bili Direct: 0.1 mg/dL (04/11/18 12:53:30) Neutro Auto: 74 x10(3)/mcL (04/11/18 12:34:46) CO Hgb:?6.1 %?High (04/11/18 12:38:49)   Bili Indirect: 0.2 mg/dL (04/11/18 12:53:30) Lymph Auto: 16 % (04/11/18 12:34:46) Met Hgb Art: 0.1 % (04/11/18 12:38:49)   AST:?12 unit/L?Low (04/11/18 12:53:30) Mono Auto: 7 % (04/11/18 12:34:46) O2 Hgb:?88.9 %?Low (04/11/18 12:38:49)   ALT: 19 unit/L (04/11/18 12:53:30) Eos Auto: 2 (04/11/18 12:34:46) Allens: Positive (04/11/18 12:38:49)   Alk Phos: 115 unit/L (04/11/18 12:53:30) Abs Eos: 0.16 (04/11/18 12:34:46) ?   Total Protein: 7.2 gm/dL (04/11/18 12:53:30) Basophil Auto: 1 (04/11/18 12:34:46) ?   Albumin Lvl: 3.6 gm/dL  (04/11/18 12:53:30) Abs Neutro: 7.31 x10(3)/mcL (04/11/18 12:34:46) ?   Globulin:?3.6 gm/mL?High (04/11/18 12:53:30) Abs Lymph: 1.6 x10(3)/mcL (04/11/18 12:34:46) ?   A/G Ratio: 1 ratio (04/11/18 12:53:30) Abs Mono: 0.71 x10(3)/mcL (04/11/18 12:34:46) ?   ? Abs Baso: 0.06 x10(3)/mcL (04/11/18 12:34:46) ?   ? IG%: 1 % (04/11/18 12:34:46) ?   ? IG#: 0.07 (04/11/18 12:34:46) ?   ?  ?  ?  ?  ?  Assessment/Plan  Myasthenia gravis exacerbation  - bulbar symptoms with left sided facial weakness, drooping, speech changes  - takes Prednisone 5mg at home daily since Nov 2017  - completely resolved with administration of 125mg Solumedrol in ED  - neurology consulted- recommend transition to 60mg Solumedrol next day with close monitoring of recurrence; OK to d/c home with 10mg prednisone and f/u in Neurology clinic  - no respiratory symptoms  - no infectious foci  - able to swallow; no airway compromise; satting 95% on RA  - CT head in ED negative for acute intracranial processes.  ?  Hx of atherosclerosis of bilateral carotids  - No interventions required previously  - Given quick resolution of bulbar symptoms and?CAD risk factors (HTN, HLD, DMII); will do ultrasound carotids next a.m.  ?  Hx of HLD- continue Lipitor  Hx of HTN-  on admission; will continue losartan. Labetalol PRN for SBP >180  Hx of DMII- holding oral hypoglycemics. Continue Lantus at bedtime. ISS medium; although ; anticipate hyperglycemia in the setting of steroid administration.  Hx of SERGE on CPAP - continue CPAP here; ABGs with elevated CO2 suggestive of CO2 retention 2/2 to SERGE/ [facility].  ?  Disposition: 57-year-old male admitted for acute myasthenia gravis exacerbation-received 125 mg on Medrol in the ED with complete resolution of symptoms.? Spoke with neurology, recommend 60mg Solumedrol next day with close monitoring for recurrence of symptoms; d/c home with 10mg Prednisone if stable. F/U US carotids in AM.   Problem List/Past Medical  History  Ongoing  At risk for aspiration  Diabetes mellitus  Diabetic - cooperative patient  HTN (hypertension)  Hypercholesteremia  Myasthenia gravis  Ocular hypertension  SERGE on CPAP  Tobacco user  Historical  No qualifying data  Procedure/Surgical History  Colonoscopy (08/28/2014), Colonoscopy, flexible, proximal to splenic flexure; with removal of tumor(s), polyp(s), or other lesion(s) by hot biopsy forceps or bipolar cautery. (08/28/2014), Endoscopic polypectomy of large intestine (08/28/2014), Injection or Infusion of Immunoglobulin (06/11/2014), Venous catheterization, not elsewhere classified (06/08/2014), Continuous invasive mechanical ventilation for less than 96 consecutive hours (06/07/2014), Insertion of endotracheal tube (06/07/2014), Carpal tunnel, Finger amputation, no complication., Tendon Injury..  Medications  Inpatient  acetaminophen, 1000 mg= 2 tab(s), Oral, q6hr, PRN  acetaminophen, 650 mg= 2 tab(s), Oral, q6hr, PRN  albuterol, 2.5 mg= 3 mL, NEB, q4hr Resp, PRN  aspirin 81 mg oral tablet, CHEWABLE, 81 mg= 1 tab(s), Oral, Daily  bisacodyl 5 mg ORAL enteric coated tablet, 30 mg= 6 tab(s), Oral, Once  brimonidine 0.1% ophthalmic solution, 0.1 %, Eye-Both, TID  busPIRone 10 mg oral tablet, 7.5 mg= 0.75 tab(s), Oral, BID  Dextrose 50% and Water (50 mL vial/syringe), 25 gm= 50 mL, IV Push, As Directed  Dextrose 50% and Water (50 mL vial/syringe), 12.5 gm= 25 mL, IV Push, Once, PRN  Dextrose 50% and Water (50 mL vial/syringe), 12.5 gm= 25 mL, IV Push, As Directed, PRN  Dextrose 50% in Water intravenous solution, 12.5 gm= 25 mL, IV Push, As Directed, PRN  dorzolamide 2% ophthalmic solution, 1 drop(s), Eye-Both, TID  fluorescein 1 mg ophthalmic test, 1 mg= 1 EA, OPTH, Once  glucagon recombinant 1 mg injection, 1 mg= 1 EA, IM, q10min, PRN  glucagon recombinant 1 mg injection, 1 mg= 1 EA, IM, q10min, PRN  glucose 40% oral gel, 15 gm= 0.5 tube(s), Oral, As Directed, PRN  ibuprofen, 400 mg= 1 tab(s), Oral,  q6hr, PRN  insulin glargine 100 U/mL (per pen), 30 units= 0.3 mL, Subcutaneous, Daily  insulin lispro 100 units/mL subcutaneous injection, 3-21 units, Subcutaneous, As Directed, PRN  labetalol, 20 mg= 4 mL, IV Push, q2hr, PRN  Lipitor 10 mg oral tablet, 10 mg, Oral, Daily  losartan 100 mg oral tablet, 100, Oral, Daily  Lovenox, 40 mg= 0.4 mL, Subcutaneous, Daily  magnesium citrate oral liquid, 1 bottle(s)= 300 mL, Oral, Once  Miralax - for colonoscopy prep, 238 gm= 14 packet(s), Oral, Once  mycophenolate mofetil 250 mg oral capsule, 1500 mg= 6 cap(s), Oral, BID  Pantoprazole 40 mg ORAL EC-Tablet, 40 mg= 1 tab(s), Oral, Daily  Solumedrol IV push / IM, 60 mg= 1.5 mL, IV Push, Once  tetracaine 0.5% ophthalmic solution, 2 drop(s), OPTH, Once  Xalatan 0.005% ophthalmic solution, 1 drop(s), Eye-Both, Once a day (at bedtime)  Zofran, 4 mg= 2 mL, IV Push, q4hr, PRN  Home  Actos 45 mg oral tablet, See Instructions, 3 refills  aspirin 81 mg oral tablet, CHEWABLE, 81 mg= 1 tab(s), Oral, Daily, 3 refills  brimonidine 0.1% ophthalmic solution, 1 drop(s), Eye-Both, TID  busPIRone 15 mg oral tablet, 7.5 mg= 0.5 tab(s), Oral, BID, 2 refills  dorzolamide 2% ophthalmic solution, 1 drop(s), Eye-Both, TID, 6 refills  Easy touch test strips to check blood sugar daily. #100. 3 Refills., See Instructions  glipiZIDE 10 mg oral tablet, 10 mg= 1 tab(s), Oral, BID, 3 refills  Glucometer strips and lancets, See Instructions  HYDROCODONE/ACETAMINOPHEN  T, 1 tab(s), Oral, TID  Lantus 100 units/mL subcutaneous solution, 30 units, Subcutaneous, Daily, 5 refills  Lipitor 10 mg oral tablet, 10 mg= 1 tab(s), Oral, Daily, 3 refills  losartan 100 mg oral tablet, 100 mg= 1 tab(s), Oral, Daily, 3 refills  metFORMIN 1000 mg oral tablet, 1000 mg= 1 tab(s), Oral, BID, 3 refills  mycophenolate mofetil 250 mg oral capsule, 1500 mg= 6 cap(s), Oral, BID, 5 refills  NexIUM 40 mg oral delayed release capsule, 40 mg= 1 cap(s), Oral, Daily, 3  refills  prednisONE 5 mg oral tablet, 5 mg= 1 tab(s), Oral, Daily, 6 refills  Xalatan 0.005% ophthalmic solution, 1 drop(s), Eye-Both, Once a day (at bedtime), 11 refills  Allergies  No Known Allergies  Social History  Alcohol - Denies Alcohol Use, 07/28/2014  Never, 02/17/2016  Employment/School  [employer], 04/21/2015  Exercise - Does not exercise, 04/21/2015  Exercise type: Walking., 03/27/2018  Home/Environment  Lives with Spouse. Living situation: Home/Independent. Home equipment: Glucose monitoring, Respiratory treatments, Walker/Cane, Wheelchair. Alcohol abuse in household: No. Substance abuse in household: No. Smoker in household: Yes. Feels unsafe at home: No. Safe place to go: Yes. Family/Friends available for support: Yes. Concern for family members at home: No. Major illness in household: No., 06/17/2016  Nutrition/Health  Regular, Caffeine intake amount: 1 cup coffee in AM., 03/27/2018  Substance Abuse - Denies Substance Abuse, 07/28/2014  Never, 02/17/2016  Tobacco  Current every day smoker, Cigarettes, 10 per day. 30 year(s). Started age 18.0 Years. Ready to change: No. Household tobacco concerns: No., 03/27/2018  Family History  Asthma.: Father.  COPD (chronic obstructive pulmonary disease).: Father.  Cancer: Brother.  Diabetes mellitus type 2: Brother.  Emphysema: Father.  Heart disease: Mother.  Sleep apnea.: Father.  Immunizations  Vaccine Date Status   tetanus/diphtheria/pertussis, acel(Tdap) 03/27/2018 Given   pneumococcal 23-polyvalent vaccine 08/08/2016 Given   pneumococcal 13-valent conjugate vaccine 04/19/2016 Given   influenza virus vaccine, inactivated 12/14/2015 Given   influenza virus vaccine, inactivated 10/02/2014 Recorded       I reviewed past medical history , lab data, and radiographic findings. Patient examined on Rounds on 4/11/2018  Case discussed with Residents. I agree with assessment and plan of care.

## 2022-05-04 NOTE — HISTORICAL [facility] CERNER
This is a historical note converted from [employer]. Formatting and pictures may have been removed.  Please reference [employer] for original formatting and attached multimedia. Chief Complaint  PT W HX OF MYASTHENIA GRAVIS W RECENT ADMIT W CO RECURRENT WEAKNESS, NUMBNESS TO FACE AND REPORTED INTERMITTEN SLURRED SPEACH SINCE SUNDAY.  History of Present Illness  Patient is a 57-year-old  male with past medical history of hypertension, hyperlipidemia, SERGE on CPAP, diabetes mellitus type II, carotid artery stenosis, myasthenia gravis (diagnosed 2014) presents to the ED with?facial weakness, slurring of speech of?3 days duration. Patient was recently d/c on 4/12 after being admitted for similar symptoms on 4/11 with complete resolution on day of d/c. Neurological work-up at last visit was unremarkable: CT of his head was negative for any acute intracranial process. ?Carotid U/S with b/l stenosis ~50%. Patient did not receive high-dose steroids or IVIG at last visit.  Today, he states that facial numbness around his mouth and left cheek began Saturday night. He continued to take his 10mg prednisone that he was discharged on (previous dose of 5mg prednisone was increased at last hospitalization) but symptoms did not improve; numbness has been intermittent. He denies drooling, dysphagia. States he has noticed some slurring of his speech. Denies loss of sensation, strength in all other parts of his body. Denies any SOB or chest tightness.  He denies headache, visual changes, nausea, vomiting, chest pain,? sore throat, cough, dysuria. He endorses diarrhea that began last night; total of 5 episodes of non bloody diarrhea episodes. States he had a jambalaya with sausage for dinner last night. Does not report any blood in his stool recently. Hospitalized in august for salmonella enteritis with respiratory compromise 2/2 MG exacerbation. No vomiting, nausea or abdominal cramping. No fever or chills.  No hx of previous strokes.  Not on home oxygenation. Uses CPAP at night for SERGE.  He has not had a thymectomy and has never been prescribed pyridostigmine. Home medications for MG are prednisone 10mg and mycophenolate mofetil 1500mg BID.  ?  Social: 40pk year smoker, active; no alcohol intake, no illicit drug use.?  FHx: parents with HTN, DMII  Surgeries: carpal tunnel repair on Right hand; amputation of Left 3rd digit  All: NKDA  Review of Systems  Constitutional: No fever, chills, + facial weakness, no fatigue  HENMT:?No vision changes, no hearing loss, sneezing, congestion, runny nose or sore throat.  Respiratory: No shortness of breath, no cough or sputum.  Cardiovascular: No chest pain, chest discomfort. No palpitations or edema.?  Gastrointestinal: No?nausea, vomiting or diarrhea. No constipation. No abdominal pain or blood.  Genitourinary: No burning on urination, no frequency.  Musculoskeletal: No muscle, back pain, joint pain or stiffness.  Integumentary: No rash or itching.  Neurologic: No headache, dizziness, syncope, paralysis or loss of sensation., +slurring of speech, + drooping of left face  Physical Exam  Vitals & Measurements  T:?36.3? ?C (Oral)? HR:?91(Peripheral)? RR:?18? BP:?137/67? SpO2:?97%? WT:?136?kg? WT:?136?kg?  Gen: NAD, +obese  HEENT: NCAT, EOMI, moist oral mucosa, no LAD, no neck masses  Resp: CTAB. Normal inspiratory effort. No cough. No hemoptysis, no crackles, wheezing.  CVS: S1, S2, regular. no murmur. 2+ pedal pulses, no edema.  Abd: NT, ND, soft, no masses. BS2+ all quadrants  MSK: NROM, Left middle digit amputated  Skin: no rash, no skin changes  Neuro: AAOx3, CNII-VII intact. No focal deficits. Sensation intact. Strength 5 out of 5 bilateral upper and lower extremities. Reflexes 2+ bilaterally elbow, patellar. No dysmetria. No gait ataxia. Uses cane for ambulation.  ?   Labs Last 24 Hours?  ?Chemistry Hematology/Coagulation   Sodium Lvl: 143 mmol/L (04/17/18 13:56:50) WBC:?14.5 x10(3)/mcL?High  (04/17/18 13:30:37)   Potassium Lvl: 4 mmol/L (04/17/18 13:56:50) RBC: 4.72 x10(6)/mcL (04/17/18 13:30:37)   Chloride: 106 mmol/L (04/17/18 13:56:50) Hgb:?9.8 gm/dL?Low (04/17/18 13:30:37)   CO2: 31 mmol/L (04/17/18 13:56:50) Hct:?35 %?Low (04/17/18 13:30:37)   Calcium Lvl: 8.9 mg/dL (04/17/18 13:56:50) Platelet:?442 x10(3)/mcL?High (04/17/18 13:30:37)   Glucose Lvl:?127 mg/dL?High (04/17/18 13:56:50) MCV:?74.2 fL?Low (04/17/18 13:30:37)   BUN: 10 mg/dL (04/17/18 13:56:50) MCH:?20.8 pg?Low (04/17/18 13:30:37)   Creatinine: 0.8 mg/dL (04/17/18 13:56:50) MCHC:?28 gm/dL?Low (04/17/18 13:30:37)   eGFR-AA: >105 (04/17/18 13:56:51) RDW:?17.3 %?High (04/17/18 13:30:37)   eGFR-SHIVANI: >105 (04/17/18 13:56:52) MPV: 9.4 fL (04/17/18 13:30:37)   Bili Total: 0.4 mg/dL (04/17/18 13:56:50) Abs Neut:?12.23 x10(3)/mcL?High (04/17/18 13:30:37)   Bili Direct: 0.1 mg/dL (04/17/18 13:56:50) Neutro Auto: 84 x10(3)/mcL (04/17/18 13:30:38)   Bili Indirect: 0.3 mg/dL (04/17/18 13:56:50) Lymph Auto:?9 %?Low (04/17/18 13:30:38)   AST:?14 unit/L?Low (04/17/18 13:56:50) Mono Auto: 5 % (04/17/18 13:30:38)   ALT: 23 unit/L (04/17/18 13:56:50) Eos Auto: 1 (04/17/18 13:30:38)   Alk Phos: 100 unit/L (04/17/18 13:56:50) Abs Eos: 0.1 x10(3)/mcL (04/17/18 13:30:38)   Total Protein: 6.9 gm/dL (04/17/18 13:56:50) Basophil Auto: 0 (04/17/18 13:30:38)   Albumin Lvl: 3.5 gm/dL (04/17/18 13:56:50) Abs Neutro: 12.23 x10(3)/mcL (04/17/18 13:30:38)   Globulin: 3.4 gm/mL (04/17/18 13:56:50) Abs Lymph: 1.31 x10(3)/mcL (04/17/18 13:30:38)   A/G Ratio: 1 ratio (04/17/18 13:56:50) Abs Mono: 0.72 x10(3)/mcL (04/17/18 13:30:38)   ? Abs Baso: 0.03 x10(3)/mcL (04/17/18 13:30:38)   ? IG%: 1 % (04/17/18 13:30:38)   ? IG#: 0.1 (04/17/18 13:30:38)   ?  ?  ?  ?  ?  Assessment/Plan  Myasthenia gravis exacerbation  - bulbar symptoms with left sided facial weakness, drooping, speech changes  - takes Prednisone 10mg at home daily since 4/12; cellcept 1500mg BID  - neurology  consulted: recommend initiating IVIG?treatment with 500mg/kg today; 1gm/kg tomorrow. Increase prednisone to 20mg daily. Patient can be set up in IVIG clinic q2wks after discharge; to continue Cellcept.  - no respiratory symptoms  - able to swallow; no airway compromise; satting 96% on RA  - admit to tele with monitor  ?   Diarrhea  - non bloody, loose stools that began last night  - stool O&P, fecal leukocyte, C diff pcr and stool culture ordered  - CT abdomen negative for infectious etiology findings  - likely viral enteritis; will monitor for resolution  - encouraged hydration; will follow-up with stool studies for further management  ?   SIRS 2/4  - tachycardic; ; leukocytosis 12.2  - lactic acid  - leukocytosis is?likely secondary to home dose of prednisone 10mg daily  - CT abdomen negative for infectious etiology of diarrhea  - will monitor for clinical changes  ?   Hx of atherosclerosis of bilateral carotids  - No interventions required previously  - US on 4/11 with ~50% b/l ICA stenosis; no interventions required at present time  ?   Hx of HLD- continue Lipitor  Hx of HTN-  on admission; will continue losartan. Labetalol PRN for SBP >180  Hx of DMII- holding oral hypoglycemics. Continue Lantus at bedtime. ISS medium; anticipate hyperglycemia in the setting of steroid administration.  Hx of SERGE on CPAP - continue CPAP here  ?   Disposition: 57-year-old male admitted for acute myasthenia gravis exacerbation-will proceed with IVIG; will follow-up with stool studies for further eval of diarrhea.   Problem List/Past Medical History  Ongoing  At risk for aspiration  Diabetes mellitus  Diabetic - cooperative patient  HTN (hypertension)  Hypercholesteremia  Myasthenia gravis  Ocular hypertension  SERGE on CPAP  Tobacco user  Historical  No qualifying data  Procedure/Surgical History  Colonoscopy (08/28/2014), Colonoscopy, flexible, proximal to splenic flexure; with removal of tumor(s), polyp(s), or other  lesion(s) by hot biopsy forceps or bipolar cautery. (08/28/2014), Endoscopic polypectomy of large intestine (08/28/2014), Injection or Infusion of Immunoglobulin (06/11/2014), Venous catheterization, not elsewhere classified (06/08/2014), Continuous invasive mechanical ventilation for less than 96 consecutive hours (06/07/2014), Insertion of endotracheal tube (06/07/2014), Carpal tunnel, Finger amputation, no complication., Tendon Injury..  Medications  Inpatient  acetaminophen, 1000 mg= 2 tab(s), Oral, q6hr, PRN  acetaminophen, 650 mg= 2 tab(s), Oral, q6hr, PRN  aspirin 81 mg oral tablet, CHEWABLE, 81 mg= 1 tab(s), Oral, Daily  atorvastatin 10 mg oral tablets, 1 tab(s), Oral, Daily  bisacodyl 5 mg ORAL enteric coated tablet, 30 mg= 6 tab(s), Oral, Once  buspirone 15 mg oral tablets, 7.5 mg= 0.5 tab(s), Oral, BID  cloNIDine, 0.2 mg= 1 tab(s), Oral, q8hr, PRN  Dextrose 50% and Water (50 mL vial/syringe), 12.5 gm= 25 mL, IV Push, Once, PRN  Dextrose 50% and Water (50 mL vial/syringe), 12.5 gm= 25 mL, IV Push, As Directed, PRN  Dextrose 50% and Water (50 mL vial/syringe), 25 gm= 50 mL, IV Push, As Directed  Dextrose 50% in Water intravenous solution, 12.5 gm= 25 mL, IV Push, As Directed, PRN  fluorescein 1 mg ophthalmic test, 1 mg= 1 EA, OPTH, Once  glucagon recombinant 1 mg injection, 1 mg= 1 EA, IM, q10min, PRN  glucagon recombinant 1 mg injection, 1 mg= 1 EA, IM, q10min, PRN  glucose 40% oral gel, 15 gm= 0.5 tube(s), Oral, As Directed, PRN  ibuprofen, 400 mg= 1 tab(s), Oral, q6hr, PRN  insulin glargine 100 U/mL (per pen), 30 units= 0.3 mL, Subcutaneous, Daily  insulin lispro 100 units/mL subcutaneous injection, 3-21 units, Subcutaneous, As Directed, PRN  IVIG (Octagam), 1 gm/kg, IV, Once  IVIG (Octagam), 500 mg/kg, IV, Once  Lovenox, 40 mg= 0.4 mL, Subcutaneous, Daily  magnesium citrate oral liquid, 1 bottle(s)= 300 mL, Oral, Once  Miralax - for colonoscopy prep, 238 gm= 14 packet(s), Oral, Once  Phenergan, 12.5 mg=  0.5 mL, IV Push, q4hr, PRN  predniSONE, 20 mg= 1 tab(s), Oral, Daily  tetracaine 0.5% ophthalmic solution, 2 drop(s), OPTH, Once  Zofran, 4 mg= 2 mL, IV Push, q4hr, PRN  Home  Actos 45 mg oral tablet, See Instructions, 3 refills  aspirin 81 mg oral tablet, CHEWABLE, 81 mg= 1 tab(s), Oral, Daily, 3 refills  brimonidine 0.1% ophthalmic solution, 1 drop(s), Eye-Both, TID  busPIRone 15 mg oral tablet, 7.5 mg= 0.5 tab(s), Oral, BID, 2 refills  dorzolamide 2% ophthalmic solution, 1 drop(s), Eye-Both, TID, 6 refills  Easy touch test strips to check blood sugar daily. #100. 3 Refills., See Instructions  glipiZIDE 10 mg oral tablet, 10 mg= 1 tab(s), Oral, BID, 3 refills  Glucometer strips and lancets, See Instructions  HYDROCODONE/ACETAMINOPHEN  T, 1 tab(s), Oral, TID  Lantus 100 units/mL subcutaneous solution, 30 units, Subcutaneous, Daily, 5 refills  Lipitor 10 mg oral tablet, 10 mg= 1 tab(s), Oral, Daily, 3 refills  losartan 100 mg oral tablet, 100 mg= 1 tab(s), Oral, Daily, 3 refills  metFORMIN 1000 mg oral tablet, 1000 mg= 1 tab(s), Oral, BID, 3 refills  mycophenolate mofetil 250 mg oral capsule, 1500 mg= 6 cap(s), Oral, BID, 5 refills  NexIUM 40 mg oral delayed release capsule, 40 mg= 1 cap(s), Oral, Daily, 3 refills  prednisONE 10 mg oral tablet, 10 mg= 1 tab(s), Oral, Daily, 3 refills  Xalatan 0.005% ophthalmic solution, 1 drop(s), Eye-Both, Once a day (at bedtime), 11 refills  Allergies  No Known Allergies  Social History  Alcohol - Denies Alcohol Use, 07/28/2014  Never, 02/17/2016  Employment/School  [employer], 04/21/2015  Exercise - Does not exercise, 04/21/2015  Exercise type: Walking., 03/27/2018  Home/Environment  Lives with Spouse. Living situation: Home/Independent. Home equipment: Glucose monitoring, Respiratory treatments, Walker/Cane, Wheelchair. Alcohol abuse in household: No. Substance abuse in household: No. Smoker in household: Yes. Feels unsafe at home: No. Safe place to go: Yes.  Family/Friends available for support: Yes. Concern for family members at home: No. Major illness in household: No., 06/17/2016  Nutrition/Health  Regular, Caffeine intake amount: 1 cup coffee in AM., 03/27/2018  Substance Abuse - Denies Substance Abuse, 07/28/2014  Never, 02/17/2016  Tobacco  Current every day smoker, Cigarettes, 10 per day. 30 year(s). Started age 18.0 Years. Ready to change: No. Household tobacco concerns: No., 03/27/2018  Family History  Asthma.: Father.  COPD (chronic obstructive pulmonary disease).: Father.  Cancer: Brother.  Diabetes mellitus type 2: Brother.  Emphysema: Father.  Heart disease: Mother.  Sleep apnea.: Father.  Immunizations  Vaccine Date Status   tetanus/diphtheria/pertussis, acel(Tdap) 03/27/2018 Given   pneumococcal 23-polyvalent vaccine 08/08/2016 Given   pneumococcal 13-valent conjugate vaccine 04/19/2016 Given   influenza virus vaccine, inactivated 12/14/2015 Given   influenza virus vaccine, inactivated 10/02/2014 Recorded       I was present with the resident during the history and physical examination.  ???  [x ] I discussed the case with the resident and agree with the findings and plan as documented in the residents note.  [ ] I discussed the case with the resident and agree with the findings and plan as documented in the residents note except:   Addendum:  ?  57 year old  male w/ PMH significant for Myasthenia Gravis diagnosed in 2014 as well as DM, SERGE on CPAP presented to [facility] ER on 4/17/18 with complaints of facial weakness and numbness and slurred speech for 3 days. He was actually recently d/h[place] from our facility on 4/12 after presenting with similar symptoms; during athat admission received IV solumedrol with quick improvement in symptoms. Admitting team that admission had discussed with neuro at the time and they recommended?increasing prednisone to 10 mg daily from 5 mg daily before on discharge. Patient also takes mycophenolate at home. He reports  that his symptoms returned over the weekend. Denies any breathing or swallowing issues or loss of motor strength.?H/o multiple admissions related to MG; admitted in 2017 for myasthenic crisis and septic shock and Salmonella bacteremia; treated with high dose steroids and IVIG during that admission. Last seen in neuro clinic in August 2017 at which time his prednisone dosage was tapered from 30 mg to 10 mg daily. On this admission patient also complains of multiple episodes of diarrhea. CT abdomen done and negative for acute disease. Will get routine stool studies but hold antibiotics for now. Discussed case with neuro on admission and recommended treatment with IVIG and increase prednisone to 20 mg daily; monitor symptoms closely for any signs of respiratory compromise. Re-evaluate patient in a.m.; if symptoms improving likely d/c after another dose of IVIG and plan for outpatient IVIG [id number] until seen by neuro. If symptoms not improving will discuss case further with neuro for additional recs.

## 2022-06-22 PROBLEM — E78.00 PURE HYPERCHOLESTEROLEMIA: Chronic | Status: ACTIVE | Noted: 2022-06-22

## 2022-06-22 PROBLEM — E66.9 OBESITY: Status: ACTIVE | Noted: 2022-06-22

## 2022-06-22 PROBLEM — E66.9 OBESITY: Chronic | Status: ACTIVE | Noted: 2022-06-22

## 2022-06-22 PROBLEM — G70.00 MYASTHENIA GRAVIS: Status: ACTIVE | Noted: 2022-06-22

## 2022-06-22 PROBLEM — Z72.0 TOBACCO USER: Chronic | Status: ACTIVE | Noted: 2022-06-22

## 2022-06-22 PROBLEM — G47.33 OBSTRUCTIVE SLEEP APNEA SYNDROME: Status: ACTIVE | Noted: 2022-06-22

## 2022-06-22 PROBLEM — E11.9 DIABETES MELLITUS: Chronic | Status: ACTIVE | Noted: 2022-06-22

## 2022-06-22 PROBLEM — Z72.0 TOBACCO USER: Status: ACTIVE | Noted: 2022-06-22

## 2022-06-22 PROBLEM — E78.00 PURE HYPERCHOLESTEROLEMIA: Status: ACTIVE | Noted: 2022-06-22

## 2022-06-22 PROBLEM — G70.00 MYASTHENIA GRAVIS: Chronic | Status: ACTIVE | Noted: 2022-06-22

## 2022-06-22 PROBLEM — I10 HYPERTENSION: Status: ACTIVE | Noted: 2022-06-22

## 2022-06-22 PROBLEM — E11.9 DIABETES MELLITUS: Status: ACTIVE | Noted: 2022-06-22

## 2022-06-22 PROBLEM — I10 HYPERTENSION: Chronic | Status: ACTIVE | Noted: 2022-06-22

## 2022-06-22 PROBLEM — H40.059 OCULAR HYPERTENSION: Chronic | Status: ACTIVE | Noted: 2022-06-22

## 2022-06-22 PROBLEM — H40.059 OCULAR HYPERTENSION: Status: ACTIVE | Noted: 2022-06-22

## 2022-07-06 PROBLEM — F41.1 GENERALIZED ANXIETY DISORDER: Status: ACTIVE | Noted: 2022-07-06

## 2022-07-06 PROBLEM — F33.42 RECURRENT MAJOR DEPRESSIVE DISORDER, IN FULL REMISSION: Chronic | Status: ACTIVE | Noted: 2022-07-06

## 2022-07-06 PROBLEM — F41.1 GENERALIZED ANXIETY DISORDER: Chronic | Status: ACTIVE | Noted: 2022-07-06

## 2022-07-06 PROBLEM — D50.9 IRON DEFICIENCY ANEMIA: Status: ACTIVE | Noted: 2022-07-06

## 2022-07-06 PROBLEM — F33.42 RECURRENT MAJOR DEPRESSIVE DISORDER, IN FULL REMISSION: Status: ACTIVE | Noted: 2022-07-06

## 2022-07-06 PROBLEM — G47.33 OBSTRUCTIVE SLEEP APNEA SYNDROME: Chronic | Status: ACTIVE | Noted: 2022-06-22

## 2022-07-06 PROBLEM — D50.9 IRON DEFICIENCY ANEMIA: Chronic | Status: ACTIVE | Noted: 2022-07-06

## 2022-07-06 PROBLEM — M48.061 SPINAL STENOSIS OF LUMBAR REGION WITHOUT NEUROGENIC CLAUDICATION: Status: ACTIVE | Noted: 2022-07-06

## 2022-07-06 PROBLEM — D75.838 REACTIVE THROMBOCYTOSIS: Status: ACTIVE | Noted: 2022-07-06

## 2022-07-06 PROBLEM — M48.061 SPINAL STENOSIS OF LUMBAR REGION WITHOUT NEUROGENIC CLAUDICATION: Chronic | Status: ACTIVE | Noted: 2022-07-06

## 2022-07-06 PROBLEM — I65.29 CAROTID ARTERY STENOSIS: Status: ACTIVE | Noted: 2022-07-06

## 2022-07-06 PROBLEM — I65.29 CAROTID ARTERY STENOSIS: Chronic | Status: ACTIVE | Noted: 2022-07-06

## 2022-07-06 PROBLEM — D75.838 REACTIVE THROMBOCYTOSIS: Chronic | Status: ACTIVE | Noted: 2022-07-06

## 2022-07-06 RX ORDER — ATORVASTATIN CALCIUM 20 MG/1
1 TABLET, FILM COATED ORAL NIGHTLY
COMMUNITY
Start: 2022-05-02 | End: 2022-07-28

## 2022-07-06 RX ORDER — HYDROCODONE BITARTRATE AND ACETAMINOPHEN 7.5; 325 MG/1; MG/1
1 TABLET ORAL EVERY 8 HOURS PRN
COMMUNITY
Start: 2022-06-27 | End: 2023-01-04

## 2022-07-06 RX ORDER — ACETAMINOPHEN 500 MG
125 TABLET ORAL DAILY
COMMUNITY
Start: 2021-07-22 | End: 2022-07-06

## 2022-07-06 RX ORDER — NAPROXEN SODIUM 220 MG/1
1 TABLET, FILM COATED ORAL DAILY
COMMUNITY
Start: 2021-11-24 | End: 2023-04-11

## 2022-07-06 RX ORDER — GABAPENTIN 300 MG/1
300 CAPSULE ORAL EVERY 8 HOURS PRN
COMMUNITY
Start: 2021-12-02 | End: 2022-07-06 | Stop reason: SDUPTHER

## 2022-07-06 RX ORDER — DULOXETIN HYDROCHLORIDE 20 MG/1
20 CAPSULE, DELAYED RELEASE ORAL DAILY
COMMUNITY
Start: 2021-11-24 | End: 2022-07-06

## 2022-07-06 RX ORDER — FUROSEMIDE 20 MG/1
1 TABLET ORAL DAILY
COMMUNITY
Start: 2022-03-14 | End: 2023-01-23

## 2022-07-06 RX ORDER — AZATHIOPRINE 50 MG/1
2 TABLET ORAL 2 TIMES DAILY
COMMUNITY
Start: 2022-01-06 | End: 2022-07-07

## 2022-07-06 RX ORDER — FINASTERIDE 5 MG/1
1 TABLET, FILM COATED ORAL DAILY
COMMUNITY
Start: 2022-01-22 | End: 2022-11-14

## 2022-07-06 RX ORDER — INSULIN GLARGINE 100 [IU]/ML
10 INJECTION, SOLUTION SUBCUTANEOUS DAILY
COMMUNITY
Start: 2022-06-03 | End: 2022-07-06

## 2022-07-06 RX ORDER — LINAGLIPTIN 5 MG/1
5 TABLET, FILM COATED ORAL DAILY
COMMUNITY
Start: 2022-06-08 | End: 2022-07-06 | Stop reason: SDUPTHER

## 2022-07-06 RX ORDER — LOSARTAN POTASSIUM 100 MG/1
1 TABLET ORAL DAILY
COMMUNITY
Start: 2022-05-23 | End: 2022-10-20

## 2022-07-06 RX ORDER — ESOMEPRAZOLE MAGNESIUM 40 MG/1
1 CAPSULE, DELAYED RELEASE ORAL DAILY
COMMUNITY
Start: 2022-06-30 | End: 2023-01-26

## 2022-07-06 RX ORDER — METFORMIN HYDROCHLORIDE 1000 MG/1
1 TABLET ORAL DAILY
COMMUNITY
Start: 2021-10-15 | End: 2022-07-06

## 2022-07-06 RX ORDER — FERROUS GLUCONATE 324(38)MG
1 TABLET ORAL DAILY
COMMUNITY
Start: 2022-05-02 | End: 2022-08-10 | Stop reason: SDUPTHER

## 2022-07-06 RX ORDER — PIOGLITAZONEHYDROCHLORIDE 45 MG/1
1 TABLET ORAL
COMMUNITY
Start: 2021-10-25 | End: 2022-09-07

## 2022-07-06 RX ORDER — GABAPENTIN 300 MG/1
300 CAPSULE ORAL 3 TIMES DAILY
COMMUNITY
Start: 2022-06-27 | End: 2022-07-06 | Stop reason: SDUPTHER

## 2022-07-06 RX ORDER — TAMSULOSIN HYDROCHLORIDE 0.4 MG/1
1 CAPSULE ORAL DAILY
COMMUNITY
Start: 2022-06-12 | End: 2023-01-16

## 2022-07-07 ENCOUNTER — OFFICE VISIT (OUTPATIENT)
Dept: NEUROLOGY | Facility: CLINIC | Age: 62
End: 2022-07-07
Payer: MEDICARE

## 2022-07-07 VITALS
SYSTOLIC BLOOD PRESSURE: 124 MMHG | HEIGHT: 70 IN | DIASTOLIC BLOOD PRESSURE: 72 MMHG | WEIGHT: 315 LBS | HEART RATE: 60 BPM | BODY MASS INDEX: 45.1 KG/M2

## 2022-07-07 DIAGNOSIS — G70.00 MYASTHENIA GRAVIS: Primary | Chronic | ICD-10-CM

## 2022-07-07 PROCEDURE — 3074F PR MOST RECENT SYSTOLIC BLOOD PRESSURE < 130 MM HG: ICD-10-PCS | Mod: CPTII,S$GLB,, | Performed by: NURSE PRACTITIONER

## 2022-07-07 PROCEDURE — 3008F PR BODY MASS INDEX (BMI) DOCUMENTED: ICD-10-PCS | Mod: CPTII,S$GLB,, | Performed by: NURSE PRACTITIONER

## 2022-07-07 PROCEDURE — 3051F PR MOST RECENT HEMOGLOBIN A1C LEVEL 7.0 - < 8.0%: ICD-10-PCS | Mod: CPTII,S$GLB,, | Performed by: NURSE PRACTITIONER

## 2022-07-07 PROCEDURE — 3078F DIAST BP <80 MM HG: CPT | Mod: CPTII,S$GLB,, | Performed by: NURSE PRACTITIONER

## 2022-07-07 PROCEDURE — 3074F SYST BP LT 130 MM HG: CPT | Mod: CPTII,S$GLB,, | Performed by: NURSE PRACTITIONER

## 2022-07-07 PROCEDURE — 4010F PR ACE/ARB THEARPY RXD/TAKEN: ICD-10-PCS | Mod: CPTII,S$GLB,, | Performed by: NURSE PRACTITIONER

## 2022-07-07 PROCEDURE — 1159F MED LIST DOCD IN RCRD: CPT | Mod: CPTII,S$GLB,, | Performed by: NURSE PRACTITIONER

## 2022-07-07 PROCEDURE — 3008F BODY MASS INDEX DOCD: CPT | Mod: CPTII,S$GLB,, | Performed by: NURSE PRACTITIONER

## 2022-07-07 PROCEDURE — 99213 OFFICE O/P EST LOW 20 MIN: CPT | Mod: S$GLB,,, | Performed by: NURSE PRACTITIONER

## 2022-07-07 PROCEDURE — 99999 PR PBB SHADOW E&M-EST. PATIENT-LVL IV: ICD-10-PCS | Mod: PBBFAC,,, | Performed by: NURSE PRACTITIONER

## 2022-07-07 PROCEDURE — 3078F PR MOST RECENT DIASTOLIC BLOOD PRESSURE < 80 MM HG: ICD-10-PCS | Mod: CPTII,S$GLB,, | Performed by: NURSE PRACTITIONER

## 2022-07-07 PROCEDURE — 3051F HG A1C>EQUAL 7.0%<8.0%: CPT | Mod: CPTII,S$GLB,, | Performed by: NURSE PRACTITIONER

## 2022-07-07 PROCEDURE — 1160F RVW MEDS BY RX/DR IN RCRD: CPT | Mod: CPTII,S$GLB,, | Performed by: NURSE PRACTITIONER

## 2022-07-07 PROCEDURE — 99999 PR PBB SHADOW E&M-EST. PATIENT-LVL IV: CPT | Mod: PBBFAC,,, | Performed by: NURSE PRACTITIONER

## 2022-07-07 PROCEDURE — 1160F PR REVIEW ALL MEDS BY PRESCRIBER/CLIN PHARMACIST DOCUMENTED: ICD-10-PCS | Mod: CPTII,S$GLB,, | Performed by: NURSE PRACTITIONER

## 2022-07-07 PROCEDURE — 1159F PR MEDICATION LIST DOCUMENTED IN MEDICAL RECORD: ICD-10-PCS | Mod: CPTII,S$GLB,, | Performed by: NURSE PRACTITIONER

## 2022-07-07 PROCEDURE — 4010F ACE/ARB THERAPY RXD/TAKEN: CPT | Mod: CPTII,S$GLB,, | Performed by: NURSE PRACTITIONER

## 2022-07-07 PROCEDURE — 99213 PR OFFICE/OUTPT VISIT, EST, LEVL III, 20-29 MIN: ICD-10-PCS | Mod: S$GLB,,, | Performed by: NURSE PRACTITIONER

## 2022-07-07 RX ORDER — PREDNISONE 5 MG/1
10 TABLET ORAL DAILY
Qty: 180 TABLET | Refills: 2 | Status: SHIPPED | OUTPATIENT
Start: 2022-07-07 | End: 2023-01-23

## 2022-07-07 NOTE — ASSESSMENT & PLAN NOTE
Symptoms and exam are stable.  Continue wean to 10 mg daily which is goal.  Discussed symptoms to watch for the would indicate flare.

## 2022-07-07 NOTE — PROGRESS NOTES
"Subjective:       Patient ID: Wilton Chavez is a 62 y.o. male.    Chief Complaint: Myasthenia gravis (Doing well denies any changes or progression denies SOB or dysphagia )      History of Present Illness:  FU visit for MG.  Patient is doing well.  He is now on alternating dose of prednisone 12.5 mg and 10 mg.  He denies any SOB, dysphagia, dysarthria, ptosis, or diplopia.  Also remains on imuran 100 mg BID.       Review of Systems  See HPI above        Outpatient Encounter Medications as of 7/7/2022   Medication Sig Dispense Refill    ACCU-CHEK BARBARA PLUS TEST STRP Strp       aspirin 81 MG Chew Take 1 tablet by mouth once daily at 6am.      atorvastatin (LIPITOR) 20 MG tablet Take 1 tablet by mouth nightly.      azaTHIOprine (IMURAN) 50 mg Tab Take 2 tablets (100 mg total) by mouth 2 (two) times daily. 120 tablet 5    BD ALCOHOL SWABS PadM       busPIRone (BUSPAR) 10 MG tablet Take 1 tablet (10 mg total) by mouth 3 (three) times daily. 270 tablet 3    DROPLET INSULIN SYR,HALF UNIT, 0.5 mL 31 gauge x 5/16" Syrg       DROPLET PEN NEEDLE 32 gauge x 5/32" Ndle       DULoxetine (CYMBALTA) 20 MG capsule Take 20 mg by mouth 2 (two) times a day.      esomeprazole (NEXIUM) 40 MG capsule Take 1 capsule by mouth once daily at 6am.      ferrous gluconate (FERGON) 324 MG tablet Take 1 tablet by mouth once daily.      finasteride (PROSCAR) 5 mg tablet Take 1 tablet by mouth once daily at 6am.      furosemide (LASIX) 20 MG tablet Take 1 tablet by mouth once daily at 6am.      gabapentin (NEURONTIN) 300 MG capsule Take 300 mg by mouth 3 (three) times daily.      HYDROcodone-acetaminophen (NORCO) 7.5-325 mg per tablet Take 1 tablet by mouth every 8 (eight) hours as needed.      insulin glargine,hum.rec.anlog (LANTUS U-100 INSULIN SUBQ) Inject into the skin 2 (two) times a day. Inject 45 units sq am; inject 40 units sq pm      losartan (COZAAR) 100 MG tablet Take 1 tablet by mouth once daily at 6am.      " "metFORMIN (GLUCOPHAGE) 1000 MG tablet Take 1,000 mg by mouth 2 (two) times daily with meals.      pioglitazone (ACTOS) 45 MG tablet Take 1 tablet by mouth before evening meal.      tamsulosin (FLOMAX) 0.4 mg Cap Take 1 tablet by mouth once daily at 6am.      TRADJENTA 5 mg Tab tablet Take 1 tablet (5 mg total) by mouth once daily. 90 tablet 2    [DISCONTINUED] predniSONE (DELTASONE) 5 MG tablet TAKE 2 AND 1/2 TABLETS ALTERNATING WITH 2 TABLETS EVERY OTHER DAY (12.5 MG,10MG ALTERNATING) 135 tablet 5    predniSONE (DELTASONE) 5 MG tablet Take 2 tablets (10 mg total) by mouth once daily. 180 tablet 2    [DISCONTINUED] azaTHIOprine (IMURAN) 50 mg Tab Take 2 tablets by mouth 2 (two) times daily.      [DISCONTINUED] busPIRone (BUSPAR) 15 MG tablet TAKE 1/2 TABLET TWICE DAILY 90 tablet 1    [DISCONTINUED] cholecalciferol, vitamin D3, (VITAMIN D3) 125 mcg (5,000 unit) Tab Take 125 mcg by mouth once daily at 6am.      [DISCONTINUED] DULoxetine (CYMBALTA) 20 MG capsule Take 20 mg by mouth once daily at 6am.      [DISCONTINUED] gabapentin (NEURONTIN) 300 MG capsule Take 300 mg by mouth every 8 (eight) hours as needed.      [DISCONTINUED] gabapentin (NEURONTIN) 300 MG capsule Take 300 mg by mouth 3 (three) times daily.       No facility-administered encounter medications on file as of 7/7/2022.      Objective:   /72   Pulse 60   Ht 5' 10.47" (1.79 m)   Wt (!) 145.2 kg (320 lb)   BMI 45.30 kg/m²          Physical Exam  Vitals and nursing note reviewed.   Constitutional:       Appearance: Normal appearance.   HENT:      Head: Normocephalic.   Eyes:      General: No visual field deficit.     Extraocular Movements: Extraocular movements intact.   Pulmonary:      Effort: Pulmonary effort is normal.   Neurological:      General: No focal deficit present.      Mental Status: He is alert and oriented to person, place, and time.      Cranial Nerves: Cranial nerves 2-12 are intact. No cranial nerve deficit (no OO " weakness, no ptosis), dysarthria or facial asymmetry.      Motor: No weakness.      Gait: Gait abnormal (walks with cane).   Psychiatric:         Attention and Perception: Attention and perception normal.           Assessment & Plan:      1. Myasthenia gravis  Overview:  Patient initally diagnosed in 2014.  Established with Dr. Billy February 2021.  Initial symptoms were ptosis and dysphagia.  Ultimately required vent for this flare.  He was initally started on mestinon, but did not tolerate (diarrhea).  He has been on Cellcept in the past.  Has been on Imuran and prednisone ever since.  Last flare was in April 2020 where he received IVIG during inpatient hospitalization.  Since establishing with Dr. Billy, his prednisone dose has been slowly weaned with goal of 10 mg daily. He is antibody positive.    Chest CT 6/11/2014-no evidence of thymoma    Antibodies 09/2021:  AChR binding 3.85  AChR blocking 47        Assessment & Plan:  Symptoms and exam are stable.  Continue wean to 10 mg daily which is goal.  Discussed symptoms to watch for the would indicate flare.     Orders:  -     predniSONE (DELTASONE) 5 MG tablet        This note was created with the assistance of voice recognition software. There may be transcription errors as a result of using this technology however minimal. Effort has been made to assure accuracy of transcription but any obvious errors or omissions should be clarified with the author of the document.

## 2022-09-21 ENCOUNTER — HISTORICAL (OUTPATIENT)
Dept: ADMINISTRATIVE | Facility: HOSPITAL | Age: 62
End: 2022-09-21
Payer: MEDICARE

## 2022-10-06 ENCOUNTER — TELEPHONE (OUTPATIENT)
Dept: NEUROLOGY | Facility: CLINIC | Age: 62
End: 2022-10-06
Payer: MEDICARE

## 2022-10-06 NOTE — TELEPHONE ENCOUNTER
"Patient's wife (Olga) called reporting she has been noticing x 3 days that the patient's both side of face is twitching. States the patient "doesn't even realize or feel that his face is doing that". Requesting a call back to further discuss if patient would need an appointment or what is next in step. Please advise.   "

## 2022-10-06 NOTE — TELEPHONE ENCOUNTER
Olga ( wife ) was informed to have him alternate Prednisone 12.5 mg with Prednisone 10 mg and to call us back if his face continues to have facial twitching. She was also informed to monitor him for any difficulty breathing or swallowing.

## 2022-10-06 NOTE — TELEPHONE ENCOUNTER
Increase his prednisone back to alternating 12.5 mg and 10 mg daily.  If that does not help over the next several days, have them call us and we will see him next week.  Please make sure he is not having any SOB or diff swallowing

## 2022-10-06 NOTE — TELEPHONE ENCOUNTER
Olga ( wife ) states in the last 3 days she notice that Mr. Chavez face has been twitching of both sides on and off. He does not notice his face is twitching or c/o pain numbness or tingling to his face. Please advise.

## 2022-10-13 ENCOUNTER — OFFICE VISIT (OUTPATIENT)
Dept: NEUROLOGY | Facility: CLINIC | Age: 62
End: 2022-10-13
Payer: MEDICARE

## 2022-10-13 VITALS
WEIGHT: 315 LBS | DIASTOLIC BLOOD PRESSURE: 78 MMHG | HEIGHT: 70 IN | SYSTOLIC BLOOD PRESSURE: 133 MMHG | BODY MASS INDEX: 45.1 KG/M2

## 2022-10-13 DIAGNOSIS — G70.00 MYASTHENIA GRAVIS: Primary | Chronic | ICD-10-CM

## 2022-10-13 PROCEDURE — 1160F RVW MEDS BY RX/DR IN RCRD: CPT | Mod: CPTII,S$GLB,, | Performed by: NURSE PRACTITIONER

## 2022-10-13 PROCEDURE — 99213 PR OFFICE/OUTPT VISIT, EST, LEVL III, 20-29 MIN: ICD-10-PCS | Mod: S$GLB,,, | Performed by: NURSE PRACTITIONER

## 2022-10-13 PROCEDURE — 99213 OFFICE O/P EST LOW 20 MIN: CPT | Mod: S$GLB,,, | Performed by: NURSE PRACTITIONER

## 2022-10-13 PROCEDURE — 3075F PR MOST RECENT SYSTOLIC BLOOD PRESS GE 130-139MM HG: ICD-10-PCS | Mod: CPTII,S$GLB,, | Performed by: NURSE PRACTITIONER

## 2022-10-13 PROCEDURE — 3051F PR MOST RECENT HEMOGLOBIN A1C LEVEL 7.0 - < 8.0%: ICD-10-PCS | Mod: CPTII,S$GLB,, | Performed by: NURSE PRACTITIONER

## 2022-10-13 PROCEDURE — 4010F PR ACE/ARB THEARPY RXD/TAKEN: ICD-10-PCS | Mod: CPTII,S$GLB,, | Performed by: NURSE PRACTITIONER

## 2022-10-13 PROCEDURE — 3078F PR MOST RECENT DIASTOLIC BLOOD PRESSURE < 80 MM HG: ICD-10-PCS | Mod: CPTII,S$GLB,, | Performed by: NURSE PRACTITIONER

## 2022-10-13 PROCEDURE — 3075F SYST BP GE 130 - 139MM HG: CPT | Mod: CPTII,S$GLB,, | Performed by: NURSE PRACTITIONER

## 2022-10-13 PROCEDURE — 99999 PR PBB SHADOW E&M-EST. PATIENT-LVL IV: ICD-10-PCS | Mod: PBBFAC,,, | Performed by: NURSE PRACTITIONER

## 2022-10-13 PROCEDURE — 99999 PR PBB SHADOW E&M-EST. PATIENT-LVL IV: CPT | Mod: PBBFAC,,, | Performed by: NURSE PRACTITIONER

## 2022-10-13 PROCEDURE — 4010F ACE/ARB THERAPY RXD/TAKEN: CPT | Mod: CPTII,S$GLB,, | Performed by: NURSE PRACTITIONER

## 2022-10-13 PROCEDURE — 1159F PR MEDICATION LIST DOCUMENTED IN MEDICAL RECORD: ICD-10-PCS | Mod: CPTII,S$GLB,, | Performed by: NURSE PRACTITIONER

## 2022-10-13 PROCEDURE — 1160F PR REVIEW ALL MEDS BY PRESCRIBER/CLIN PHARMACIST DOCUMENTED: ICD-10-PCS | Mod: CPTII,S$GLB,, | Performed by: NURSE PRACTITIONER

## 2022-10-13 PROCEDURE — 3051F HG A1C>EQUAL 7.0%<8.0%: CPT | Mod: CPTII,S$GLB,, | Performed by: NURSE PRACTITIONER

## 2022-10-13 PROCEDURE — 1159F MED LIST DOCD IN RCRD: CPT | Mod: CPTII,S$GLB,, | Performed by: NURSE PRACTITIONER

## 2022-10-13 PROCEDURE — 3078F DIAST BP <80 MM HG: CPT | Mod: CPTII,S$GLB,, | Performed by: NURSE PRACTITIONER

## 2022-10-13 NOTE — PROGRESS NOTES
Subjective:       Patient ID: Wilton Chavez is a 62 y.o. male.    Chief Complaint: Myasthenia gravis (Pt states wife saw twitching in sleep and was told to increase prednisone ... states since increased dosage hasn't noticed twitch. Denies SOB or dysphagia )      History of Present Illness:  Follow-up visit for myasthenia gravis.  At last visit, his prednisone dose was decreased to the goal of 10 mg daily.  He reports he did well with the decrease in dose.  He denies any shortness of breath, dysphagia, dysarthria, ptosis, diplopia, or muscle weakness.  He feels generally weak at baseline, but knows this is part of the disease process.  Last week, his wife called the office to report facial twitching.  She felt it was due to the decrease in prednisone does so we temporarily increase the prednisone dose back to alternating 12.5 mg in 10 mg until we could see him in the visit this week to get more info.  He tells me that his wife noticed eye twitching which she goes on to describe as a blinking movement in both eyes.  He says it occurred once, but his wife is very cautious which is why she called.  He thinks it was due to a recent change in his eye drops from his ophthalmologist.  He has chronic dry eye and is on several different eyedrops.  He says he had an eye infection about 3 weeks ago that required treatment.  He thinks what his wife witnessed was actually him blinking hard because of his eyedrops or his dry eyes.  He was not sleeping during the event.  He says he was awake and he never noticed any sensation of twitching.      Review of Systems  I have reviewed a 12 point review of systems which is negative unless otherwise stated in HPI        Outpatient Encounter Medications as of 10/13/2022   Medication Sig Dispense Refill    aspirin 81 MG Chew Take 1 tablet by mouth once daily at 6am.      atorvastatin (LIPITOR) 20 MG tablet TAKE 1 TABLET EVERY DAY 90 tablet 3    azaTHIOprine (IMURAN) 50 mg Tab Take 2  "tablets (100 mg total) by mouth 2 (two) times daily. 120 tablet 5    BD ALCOHOL SWABS PadM       blood sugar diagnostic (ACCU-CHEK BARBARA PLUS TEST STRP) Strp USE TO CHECK BLOOD GLUCOSE TWICE DAILY 200 strip 11    busPIRone (BUSPAR) 10 MG tablet Take 1 tablet (10 mg total) by mouth 3 (three) times daily. 270 tablet 3    DROPLET INSULIN SYR,HALF UNIT, 0.5 mL 31 gauge x 5/16" Syrg       DROPLET PEN NEEDLE 32 gauge x 5/32" Ndle       DULoxetine (CYMBALTA) 20 MG capsule Take 20 mg by mouth 2 (two) times a day.      esomeprazole (NEXIUM) 40 MG capsule Take 1 capsule by mouth once daily at 6am.      ferrous gluconate (FERGON) 324 MG tablet Take 1 tablet (324 mg total) by mouth once daily. 90 tablet 0    finasteride (PROSCAR) 5 mg tablet Take 1 tablet by mouth once daily at 6am.      furosemide (LASIX) 20 MG tablet Take 1 tablet by mouth once daily at 6am.      gabapentin (NEURONTIN) 300 MG capsule Take 300 mg by mouth 3 (three) times daily.      HYDROcodone-acetaminophen (NORCO) 7.5-325 mg per tablet Take 1 tablet by mouth every 8 (eight) hours as needed.      insulin glargine,hum.rec.anlog (LANTUS U-100 INSULIN SUBQ) Inject into the skin 2 (two) times a day. Inject 45 units sq am; inject 40 units sq pm      losartan (COZAAR) 100 MG tablet Take 1 tablet by mouth once daily at 6am.      metFORMIN (GLUCOPHAGE) 1000 MG tablet TAKE 1 TABLET TWICE DAILY 180 tablet 3    pioglitazone (ACTOS) 45 MG tablet TAKE 1 TABLET EVERY DAY 90 tablet 3    predniSONE (DELTASONE) 5 MG tablet Take 2 tablets (10 mg total) by mouth once daily. 180 tablet 2    tamsulosin (FLOMAX) 0.4 mg Cap Take 1 tablet by mouth once daily at 6am.      linaGLIPtin (TRADJENTA) 5 mg Tab tablet Take 1 tablet (5 mg total) by mouth once daily. for 10 days 10 tablet 0     No facility-administered encounter medications on file as of 10/13/2022.      Objective:   /78 (BP Location: Left arm, Patient Position: Sitting)   Ht 5' 10" (1.778 m)   Wt (!) 145.6 kg (321 " lb)   BMI 46.06 kg/m²          Physical Exam  Vitals and nursing note reviewed.   Constitutional:       Appearance: Normal appearance.   HENT:      Head: Normocephalic.   Eyes:      General: No visual field deficit.     Extraocular Movements: Extraocular movements intact.   Pulmonary:      Effort: Pulmonary effort is normal.   Neurological:      General: No focal deficit present.      Mental Status: He is alert and oriented to person, place, and time.      Cranial Nerves: Cranial nerves 2-12 are intact. No cranial nerve deficit (no OO weakness, no ptosis), dysarthria or facial asymmetry.      Motor: No weakness (cheek puff strong, no neck weakness, no trap or deltoid weakness).      Gait: Gait abnormal (antalgic, walks with cane).   Psychiatric:         Attention and Perception: Attention and perception normal.         Assessment & Plan:      1. Myasthenia gravis  Overview:  Patient initally diagnosed in 2014.  Established with Dr. Billy February 2021.  Initial symptoms were ptosis and dysphagia.  Ultimately required vent for this flare.  He was initally started on mestinon, but did not tolerate (diarrhea).  He has been on Cellcept in the past.  Has been on Imuran and prednisone ever since.  Last flare was in April 2020 where he received IVIG during inpatient hospitalization.  Since establishing with Dr. Billy, his prednisone dose has been slowly weaned with goal of 10 mg daily. He is antibody positive.    Chest CT 6/11/2014-no evidence of thymoma    Antibodies 09/2021:  AChR binding 3.85  AChR blocking 47        Assessment & Plan:  -Single incidence of eye twitch not likely from Myasthenia.  Go back down to 10 mg daily of prednisone which is goal dose  -Continue Imuran          This note was created with the assistance of voice recognition software. There may be transcription errors as a result of using this technology however minimal. Effort has been made to assure accuracy of transcription but any obvious  errors or omissions should be clarified with the author of the document.

## 2022-10-13 NOTE — ASSESSMENT & PLAN NOTE
-Single incidence of eye twitch not likely from Myasthenia.  Go back down to 10 mg daily of prednisone which is goal dose  -Continue Imuran

## 2022-11-09 ENCOUNTER — DOCUMENTATION ONLY (OUTPATIENT)
Dept: ADMINISTRATIVE | Facility: HOSPITAL | Age: 62
End: 2022-11-09
Payer: MEDICARE

## 2023-01-04 ENCOUNTER — HOSPITAL ENCOUNTER (OUTPATIENT)
Dept: RADIOLOGY | Facility: HOSPITAL | Age: 63
Discharge: HOME | End: 2023-01-04
Attending: INTERNAL MEDICINE
Payer: MEDICARE

## 2023-01-04 DIAGNOSIS — N50.89 TESTICULAR SWELLING, RIGHT: ICD-10-CM

## 2023-01-04 PROBLEM — Z79.4 CONTROLLED TYPE 2 DIABETES MELLITUS WITHOUT COMPLICATION, WITH LONG-TERM CURRENT USE OF INSULIN: Chronic | Status: ACTIVE | Noted: 2022-06-22

## 2023-01-04 PROBLEM — F33.42 RECURRENT MAJOR DEPRESSIVE DISORDER, IN FULL REMISSION: Chronic | Status: RESOLVED | Noted: 2022-07-06 | Resolved: 2023-01-04

## 2023-01-04 PROBLEM — E11.9 DIABETES MELLITUS: Chronic | Status: RESOLVED | Noted: 2022-06-22 | Resolved: 2023-01-04

## 2023-01-04 PROBLEM — Z79.4 CONTROLLED TYPE 2 DIABETES MELLITUS WITHOUT COMPLICATION, WITH LONG-TERM CURRENT USE OF INSULIN: Status: ACTIVE | Noted: 2022-06-22

## 2023-01-04 PROCEDURE — 76870 US EXAM SCROTUM: CPT | Mod: TC

## 2023-01-18 ENCOUNTER — HOSPITAL ENCOUNTER (OUTPATIENT)
Dept: RADIOLOGY | Facility: HOSPITAL | Age: 63
Discharge: HOME | End: 2023-01-18
Attending: INTERNAL MEDICINE
Payer: MEDICARE

## 2023-01-18 DIAGNOSIS — F17.210 SMOKING GREATER THAN 20 PACK YEARS: ICD-10-CM

## 2023-01-18 PROCEDURE — 71271 CT THORAX LUNG CANCER SCR C-: CPT | Mod: TC

## 2023-03-13 ENCOUNTER — OFFICE VISIT (OUTPATIENT)
Dept: NEUROLOGY | Facility: CLINIC | Age: 63
End: 2023-03-13
Payer: MEDICARE

## 2023-03-13 VITALS
BODY MASS INDEX: 43.81 KG/M2 | SYSTOLIC BLOOD PRESSURE: 122 MMHG | DIASTOLIC BLOOD PRESSURE: 84 MMHG | HEIGHT: 70 IN | HEART RATE: 90 BPM | WEIGHT: 306 LBS

## 2023-03-13 DIAGNOSIS — G70.00 MYASTHENIA GRAVIS: Primary | ICD-10-CM

## 2023-03-13 PROCEDURE — 3074F SYST BP LT 130 MM HG: CPT | Mod: CPTII,S$GLB,, | Performed by: PSYCHIATRY & NEUROLOGY

## 2023-03-13 PROCEDURE — 1159F MED LIST DOCD IN RCRD: CPT | Mod: CPTII,S$GLB,, | Performed by: PSYCHIATRY & NEUROLOGY

## 2023-03-13 PROCEDURE — 3052F HG A1C>EQUAL 8.0%<EQUAL 9.0%: CPT | Mod: CPTII,S$GLB,, | Performed by: PSYCHIATRY & NEUROLOGY

## 2023-03-13 PROCEDURE — 1160F RVW MEDS BY RX/DR IN RCRD: CPT | Mod: CPTII,S$GLB,, | Performed by: PSYCHIATRY & NEUROLOGY

## 2023-03-13 PROCEDURE — 99999 PR PBB SHADOW E&M-EST. PATIENT-LVL V: ICD-10-PCS | Mod: PBBFAC,,, | Performed by: PSYCHIATRY & NEUROLOGY

## 2023-03-13 PROCEDURE — 1160F PR REVIEW ALL MEDS BY PRESCRIBER/CLIN PHARMACIST DOCUMENTED: ICD-10-PCS | Mod: CPTII,S$GLB,, | Performed by: PSYCHIATRY & NEUROLOGY

## 2023-03-13 PROCEDURE — 99214 PR OFFICE/OUTPT VISIT, EST, LEVL IV, 30-39 MIN: ICD-10-PCS | Mod: S$GLB,,, | Performed by: PSYCHIATRY & NEUROLOGY

## 2023-03-13 PROCEDURE — 99999 PR PBB SHADOW E&M-EST. PATIENT-LVL V: CPT | Mod: PBBFAC,,, | Performed by: PSYCHIATRY & NEUROLOGY

## 2023-03-13 PROCEDURE — 3079F DIAST BP 80-89 MM HG: CPT | Mod: CPTII,S$GLB,, | Performed by: PSYCHIATRY & NEUROLOGY

## 2023-03-13 PROCEDURE — 3008F BODY MASS INDEX DOCD: CPT | Mod: CPTII,S$GLB,, | Performed by: PSYCHIATRY & NEUROLOGY

## 2023-03-13 PROCEDURE — 3074F PR MOST RECENT SYSTOLIC BLOOD PRESSURE < 130 MM HG: ICD-10-PCS | Mod: CPTII,S$GLB,, | Performed by: PSYCHIATRY & NEUROLOGY

## 2023-03-13 PROCEDURE — 3052F PR MOST RECENT HEMOGLOBIN A1C LEVEL 8.0 - < 9.0%: ICD-10-PCS | Mod: CPTII,S$GLB,, | Performed by: PSYCHIATRY & NEUROLOGY

## 2023-03-13 PROCEDURE — 3079F PR MOST RECENT DIASTOLIC BLOOD PRESSURE 80-89 MM HG: ICD-10-PCS | Mod: CPTII,S$GLB,, | Performed by: PSYCHIATRY & NEUROLOGY

## 2023-03-13 PROCEDURE — 4010F PR ACE/ARB THEARPY RXD/TAKEN: ICD-10-PCS | Mod: CPTII,S$GLB,, | Performed by: PSYCHIATRY & NEUROLOGY

## 2023-03-13 PROCEDURE — 3008F PR BODY MASS INDEX (BMI) DOCUMENTED: ICD-10-PCS | Mod: CPTII,S$GLB,, | Performed by: PSYCHIATRY & NEUROLOGY

## 2023-03-13 PROCEDURE — 4010F ACE/ARB THERAPY RXD/TAKEN: CPT | Mod: CPTII,S$GLB,, | Performed by: PSYCHIATRY & NEUROLOGY

## 2023-03-13 PROCEDURE — 99214 OFFICE O/P EST MOD 30 MIN: CPT | Mod: S$GLB,,, | Performed by: PSYCHIATRY & NEUROLOGY

## 2023-03-13 PROCEDURE — 1159F PR MEDICATION LIST DOCUMENTED IN MEDICAL RECORD: ICD-10-PCS | Mod: CPTII,S$GLB,, | Performed by: PSYCHIATRY & NEUROLOGY

## 2023-03-13 NOTE — PROGRESS NOTES
"  Chief Complaint   Patient presents with    Myasthenia Gravis     Denies any SOB, dysphagia or double vision.         This is a 62 y.o. male here for follow up for myasthenia gravis.  On prednisone 10 mg daily.  He denies any shortness of breath, dysphagia, dysarthria, ptosis, diplopia, or muscle weakness.  He overall feels good and denies weakness.  Denies side effects from medication.  Recent labs in January showed normal white blood count.  Recall patient is on Imuran.  Has not had CMP since last June.  Medication List with Changes/Refills   Current Medications    ACCU-CHEK GUIDE TEST STRIPS STRP    Use once daily for CBG testing    ALCOHOL SWABS (DROPSAFE ALCOHOL PREP PADS) PADM    USE  TO TEST BLOOD SUGAR TWICE DAILY    ASPIRIN 81 MG CHEW    Take 1 tablet by mouth once daily at 6am.    ATORVASTATIN (LIPITOR) 20 MG TABLET    TAKE 1 TABLET EVERY DAY    AZATHIOPRINE (IMURAN) 50 MG TAB    TAKE 2 TABLETS TWICE DAILY    BLOOD-GLUCOSE METER (ACCU-CHEK BARBARA PLUS METER) MISC    1 strip by skin prick route Daily.    BUSPIRONE (BUSPAR) 10 MG TABLET    Take 1 tablet (10 mg total) by mouth 3 (three) times daily.    DROPLET PEN NEEDLE 32 GAUGE X 5/32" NDLE        DULOXETINE (CYMBALTA) 20 MG CAPSULE    Take 1 capsule (20 mg total) by mouth once daily.    ESOMEPRAZOLE (NEXIUM) 40 MG CAPSULE    TAKE 1 CAPSULE EVERY DAY    FINASTERIDE (PROSCAR) 5 MG TABLET    TAKE 1 TABLET EVERY DAY    FUROSEMIDE (LASIX) 40 MG TABLET    Take 1 tablet (40 mg total) by mouth once daily.    GABAPENTIN (NEURONTIN) 300 MG CAPSULE    Take 1 capsule (300 mg total) by mouth 3 (three) times daily.    HYDROCODONE-ACETAMINOPHEN (NORCO)  MG PER TABLET    TAKE 1 TABLET BY MOUTH EVERY 8 HOURS AS NEEDED FOR PAIN FOR 30 DAYS    INSULIN GLARGINE (LANTUS U-100 INSULIN) 100 UNIT/ML INJECTION    INJECT 45 UNITS UNDER THE SKIN EVERY MORNING AND 40 UNITS EVERY EVENING    INSULIN SYR/NDL U100 HALF HONG (DROPLET INSULIN SYR,HALF UNIT,) 0.5 ML 31 GAUGE X 5/16" " SYRG    USE  TO  INJECT  INSULIN    LANCETS (ACCU-CHEK SOFTCLIX LANCETS) MISC    Use ONCE daily for cbg TESTING    LINAGLIPTIN (TRADJENTA) 5 MG TAB TABLET    Take 1 tablet (5 mg total) by mouth once daily. for 10 days    LOSARTAN (COZAAR) 100 MG TABLET    TAKE 1 TABLET EVERY DAY    METFORMIN (GLUCOPHAGE) 1000 MG TABLET    TAKE 1 TABLET TWICE DAILY    PIOGLITAZONE (ACTOS) 45 MG TABLET    TAKE 1 TABLET EVERY DAY    PREDNISONE (DELTASONE) 5 MG TABLET    TAKE 2 TABLETS ONE TIME DAILY    TAMSULOSIN (FLOMAX) 0.4 MG CAP    TAKE 1 CAPSULE AT BEDTIME        Vitals:    03/13/23 1121   BP: 122/84   Pulse: 90        General: alert and oriented, no acute distress, no audible wheezes, pulse intact, no edema    Cognition and Comprehension  Speech and language intact  Follows commands  Speech fluent  Attention intact  Memory for recent events intact from history taking  Affect pleasant  Fund of knowledge adequate    Cranial nerves  II. Optic: Visual fields full to confrontation both eyes  III, IV, VI. Oculomotor: Intact, Pupils equal, round and reactive to light, no nystagmus, mild OO weakness  V. Trigeminal: sensation to light touch normal  VII. No facial asymmetry or facial weakness  VIII. Hearing intact to spoken voice  IX/X. Glossopharyngeal/Vagus: Voice normal, palate rises symmetrically  XI. Axillary: Shoulder shrug normal  XII. Hypoglossal: Intact    Muscle Strength and Tone  Normal upper extremity tone  Normal lower extremity tone  Normal upper extremity strength  Normal lower extremity strength        Coordination and Gait  Finger to nose normal  Gait normal     1. Myasthenia gravis  Overview:  Patient initally diagnosed in 2014.  Established with Dr. Billy February 2021.  Initial symptoms were ptosis and dysphagia.  Ultimately required vent for this flare.  He was initally started on mestinon, but did not tolerate (diarrhea).  He has been on Cellcept in the past.  Has been on Imuran and prednisone ever since.  Last  flare was in April 2020 where he received IVIG during inpatient hospitalization.  Since establishing with Dr. Billy, his prednisone dose has been slowly weaned with goal of 10 mg daily. He is antibody positive.    Chest CT 6/11/2014-no evidence of thymoma    Antibodies 09/2021:  AChR binding 3.85  AChR blocking 47        Orders:  -     Comprehensive Metabolic Panel; Future     Cont prednisone 10, Imuran, check CMP

## 2023-03-27 PROBLEM — F17.200 TOBACCO USE DISORDER: Status: ACTIVE | Noted: 2022-06-22

## 2023-03-27 PROBLEM — F17.200 TOBACCO USE DISORDER: Chronic | Status: ACTIVE | Noted: 2022-06-22

## 2023-04-14 ENCOUNTER — HOSPITAL ENCOUNTER (OUTPATIENT)
Dept: CARDIOLOGY | Facility: HOSPITAL | Age: 63
Discharge: HOME | End: 2023-04-14
Attending: INTERNAL MEDICINE
Payer: MEDICARE

## 2023-04-14 DIAGNOSIS — I65.23 BILATERAL CAROTID ARTERY STENOSIS: ICD-10-CM

## 2023-04-14 PROCEDURE — 93880 EXTRACRANIAL BILAT STUDY: CPT

## 2023-04-14 PROCEDURE — 93880 CV US DOPPLER CAROTID (CUPID ONLY): ICD-10-PCS | Mod: 26,,, | Performed by: SURGERY

## 2023-04-14 PROCEDURE — 93880 EXTRACRANIAL BILAT STUDY: CPT | Mod: 26,,, | Performed by: SURGERY

## 2023-09-19 ENCOUNTER — OFFICE VISIT (OUTPATIENT)
Dept: NEUROLOGY | Facility: CLINIC | Age: 63
End: 2023-09-19
Payer: MEDICARE

## 2023-09-19 VITALS
BODY MASS INDEX: 45.1 KG/M2 | HEART RATE: 84 BPM | SYSTOLIC BLOOD PRESSURE: 126 MMHG | HEIGHT: 70 IN | WEIGHT: 315 LBS | DIASTOLIC BLOOD PRESSURE: 60 MMHG

## 2023-09-19 DIAGNOSIS — G70.00 MYASTHENIA GRAVIS: Primary | Chronic | ICD-10-CM

## 2023-09-19 PROCEDURE — 99214 OFFICE O/P EST MOD 30 MIN: CPT | Mod: S$GLB,,, | Performed by: NURSE PRACTITIONER

## 2023-09-19 PROCEDURE — 99214 PR OFFICE/OUTPT VISIT, EST, LEVL IV, 30-39 MIN: ICD-10-PCS | Mod: S$GLB,,, | Performed by: NURSE PRACTITIONER

## 2023-09-19 PROCEDURE — 99999 PR PBB SHADOW E&M-EST. PATIENT-LVL IV: CPT | Mod: PBBFAC,,, | Performed by: NURSE PRACTITIONER

## 2023-09-19 PROCEDURE — 3044F PR MOST RECENT HEMOGLOBIN A1C LEVEL <7.0%: ICD-10-PCS | Mod: CPTII,S$GLB,, | Performed by: NURSE PRACTITIONER

## 2023-09-19 PROCEDURE — 4010F ACE/ARB THERAPY RXD/TAKEN: CPT | Mod: CPTII,S$GLB,, | Performed by: NURSE PRACTITIONER

## 2023-09-19 PROCEDURE — 3008F PR BODY MASS INDEX (BMI) DOCUMENTED: ICD-10-PCS | Mod: CPTII,S$GLB,, | Performed by: NURSE PRACTITIONER

## 2023-09-19 PROCEDURE — 3074F SYST BP LT 130 MM HG: CPT | Mod: CPTII,S$GLB,, | Performed by: NURSE PRACTITIONER

## 2023-09-19 PROCEDURE — 3066F NEPHROPATHY DOC TX: CPT | Mod: CPTII,S$GLB,, | Performed by: NURSE PRACTITIONER

## 2023-09-19 PROCEDURE — 1159F PR MEDICATION LIST DOCUMENTED IN MEDICAL RECORD: ICD-10-PCS | Mod: CPTII,S$GLB,, | Performed by: NURSE PRACTITIONER

## 2023-09-19 PROCEDURE — 1160F PR REVIEW ALL MEDS BY PRESCRIBER/CLIN PHARMACIST DOCUMENTED: ICD-10-PCS | Mod: CPTII,S$GLB,, | Performed by: NURSE PRACTITIONER

## 2023-09-19 PROCEDURE — 3008F BODY MASS INDEX DOCD: CPT | Mod: CPTII,S$GLB,, | Performed by: NURSE PRACTITIONER

## 2023-09-19 PROCEDURE — 1160F RVW MEDS BY RX/DR IN RCRD: CPT | Mod: CPTII,S$GLB,, | Performed by: NURSE PRACTITIONER

## 2023-09-19 PROCEDURE — 3061F PR NEG MICROALBUMINURIA RESULT DOCUMENTED/REVIEW: ICD-10-PCS | Mod: CPTII,S$GLB,, | Performed by: NURSE PRACTITIONER

## 2023-09-19 PROCEDURE — 3078F PR MOST RECENT DIASTOLIC BLOOD PRESSURE < 80 MM HG: ICD-10-PCS | Mod: CPTII,S$GLB,, | Performed by: NURSE PRACTITIONER

## 2023-09-19 PROCEDURE — 3074F PR MOST RECENT SYSTOLIC BLOOD PRESSURE < 130 MM HG: ICD-10-PCS | Mod: CPTII,S$GLB,, | Performed by: NURSE PRACTITIONER

## 2023-09-19 PROCEDURE — 4010F PR ACE/ARB THEARPY RXD/TAKEN: ICD-10-PCS | Mod: CPTII,S$GLB,, | Performed by: NURSE PRACTITIONER

## 2023-09-19 PROCEDURE — 99999 PR PBB SHADOW E&M-EST. PATIENT-LVL IV: ICD-10-PCS | Mod: PBBFAC,,, | Performed by: NURSE PRACTITIONER

## 2023-09-19 PROCEDURE — 1159F MED LIST DOCD IN RCRD: CPT | Mod: CPTII,S$GLB,, | Performed by: NURSE PRACTITIONER

## 2023-09-19 PROCEDURE — 3078F DIAST BP <80 MM HG: CPT | Mod: CPTII,S$GLB,, | Performed by: NURSE PRACTITIONER

## 2023-09-19 PROCEDURE — 3066F PR DOCUMENTATION OF TREATMENT FOR NEPHROPATHY: ICD-10-PCS | Mod: CPTII,S$GLB,, | Performed by: NURSE PRACTITIONER

## 2023-09-19 PROCEDURE — 3061F NEG MICROALBUMINURIA REV: CPT | Mod: CPTII,S$GLB,, | Performed by: NURSE PRACTITIONER

## 2023-09-19 PROCEDURE — 3044F HG A1C LEVEL LT 7.0%: CPT | Mod: CPTII,S$GLB,, | Performed by: NURSE PRACTITIONER

## 2023-09-19 NOTE — PROGRESS NOTES
Subjective:       Patient ID: Wilton Chavez is a 63 y.o. male.    Chief Complaint: Myasthenia Gravis (Pt states doing well denies changes currently prednisone 10 mg daily )      History of Present Illness:  Follow up visit for MG.  On prednisone 10 mg daily and Imuran.  He denies any shortness of breath, dysphagia, dysarthria, ptosis, diplopia, or muscle weakness.  He overall feels good and denies weakness.  Denies side effects from medication.  Recent labs in March showed normal white blood count.  A1c down to 6.7.  Did not have CMP after last visit.  Did have BMP with normal Cr         Review of Systems  I have reviewed a 12 point review of systems which is negative unless otherwise stated in HPI        Past Medical History:   Diagnosis Date    Carotid artery stenosis 07/06/2022    Diabetes mellitus 06/22/2022    Generalized anxiety disorder 07/06/2022    History of carpal tunnel release     History of esophagogastroduodenoscopy (EGD)     Hx of colonoscopy     Hypertension 06/22/2022    Iron deficiency anemia 07/06/2022    Myasthenia gravis 06/22/2022    Myasthenia gravis     Obesity 06/22/2022    Obstructive sleep apnea syndrome 06/22/2022    Ocular hypertension 06/22/2022    Personal history of colonic polyps 06/25/2020    Pure hypercholesterolemia 06/22/2022    Reactive thrombocytosis 07/06/2022    Recurrent major depressive disorder, in full remission 07/06/2022    S/P tendon repair     Spinal stenosis of lumbar region without neurogenic claudication 07/06/2022    Tobacco use disorder 6/22/2022    Tobacco user 06/22/2022       Past Surgical History:   Procedure Laterality Date    COLONOSCOPY      ESOPHAGOGASTRODUODENOSCOPY      EXTRACTION OF CATARACT      TENDON REPAIR          Family History   Problem Relation Age of Onset    Heart disease Mother     Asthma Father     COPD Father     Emphysema Father     Sleep apnea Father     Cancer Brother     Diabetes Brother      "    Social History     Socioeconomic History    Marital status:    Tobacco Use    Smoking status: Every Day     Current packs/day: 1.00     Types: Cigarettes    Smokeless tobacco: Never   Substance and Sexual Activity    Alcohol use: Not Currently    Drug use: Yes     Types: Hydrocodone        Outpatient Encounter Medications as of 9/19/2023   Medication Sig Dispense Refill    ACCU-CHEK GUIDE TEST STRIPS Strp Use once daily for CBG testing 100 strip 11    alcohol swabs (DROPSAFE ALCOHOL PREP PADS) PadM USE  TO TEST BLOOD SUGAR TWICE DAILY 90 each 11    aspirin 81 MG Chew CHEW AND SWALLOW 1 TABLET EVERY DAY 90 tablet 1    atorvastatin (LIPITOR) 20 MG tablet TAKE 1 TABLET EVERY DAY 90 tablet 3    azaTHIOprine (IMURAN) 50 mg Tab TAKE 2 TABLETS TWICE DAILY 360 tablet 2    blood-glucose meter (ACCU-CHEK BARBARA PLUS METER) Misc 1 strip by skin prick route Daily. 50 each 6    busPIRone (BUSPAR) 10 MG tablet TAKE 1 TABLET THREE TIMES DAILY (DOSE INCREASE) 270 tablet 3    DULoxetine (CYMBALTA) 20 MG capsule TAKE 1 CAPSULE ONE TIME DAILY 90 capsule 3    esomeprazole (NEXIUM) 40 MG capsule TAKE 1 CAPSULE EVERY DAY 90 capsule 3    finasteride (PROSCAR) 5 mg tablet TAKE 1 TABLET EVERY DAY 90 tablet 3    furosemide (LASIX) 40 MG tablet Take 1 tablet (40 mg total) by mouth once daily. 30 tablet 11    gabapentin (NEURONTIN) 300 MG capsule Take 1 capsule (300 mg total) by mouth 3 (three) times daily. 270 capsule 2    HYDROcodone-acetaminophen (NORCO)  mg per tablet TAKE 1 TABLET BY MOUTH EVERY 8 HOURS AS NEEDED FOR PAIN FOR 30 DAYS      insulin glargine (LANTUS U-100 INSULIN) 100 unit/mL injection INJECT 45 UNITS UNDER THE SKIN EVERY MORNING AND 40 UNITS EVERY EVENING (Patient taking differently: INJECT 47 units in AM and 40 units at night) 40 mL 6    insulin syr/ndl U100 half hemant (DROPLET INSULIN SYR,HALF UNIT,) 0.5 mL 31 gauge x 5/16" Syrg USE  TO  INJECT  INSULIN 200 each 6    lancets (ACCU-CHEK " "SOFTCLIX LANCETS) Misc Use ONCE daily for cbg TESTING 100 each 11    losartan (COZAAR) 100 MG tablet TAKE 1 TABLET EVERY DAY 90 tablet 3    metFORMIN (GLUCOPHAGE) 1000 MG tablet TAKE 1 TABLET TWICE DAILY 180 tablet 3    pen needle, diabetic (DROPLET PEN NEEDLE) 32 gauge x 5/32" Ndle USE FOR INSULIN EVERY  each 2    pioglitazone (ACTOS) 45 MG tablet TAKE 1 TABLET EVERY DAY 90 tablet 3    predniSONE (DELTASONE) 5 MG tablet TAKE 2 TABLETS ONE TIME DAILY 180 tablet 2    tamsulosin (FLOMAX) 0.4 mg Cap TAKE 1 CAPSULE AT BEDTIME 90 capsule 1    TRADJENTA 5 mg Tab tablet TAKE 1 TABLET EVERY DAY 90 tablet 1    vitamin D (VITAMIN D3) 1000 units Tab Take 1,000 Units by mouth once daily.      [DISCONTINUED] DULoxetine (CYMBALTA) 20 MG capsule Take 1 capsule (20 mg total) by mouth once daily. 90 capsule 3    [DISCONTINUED] pioglitazone (ACTOS) 45 MG tablet TAKE 1 TABLET EVERY DAY 90 tablet 3    [DISCONTINUED] tamsulosin (FLOMAX) 0.4 mg Cap TAKE 1 CAPSULE AT BEDTIME 90 capsule 1     No facility-administered encounter medications on file as of 9/19/2023.      Objective:   /60   Pulse 84   Ht 5' 10" (1.778 m)   Wt (!) 147 kg (324 lb)   BMI 46.49 kg/m²        Physical Exam  General:  Alert and oriented  NAD  No overt edema    Cognition and Comprehension:  Speech and language intact  Follows commands    Cranial nerves:   CN 2_ Visual fields (full to confrontation both eyes)  CN 3, 4, 6_ Intact, MADHAV, no nystagmus, mild OO weakness  CN 5_facial tactile sensation intact  CN 7_no face asymmetry; normal eye closure and smile  CN 8_hearing intact to spoken voice  CN 9, 10, 11_voice normal, shoulder shrug ok; deltoids not fatigable   CN 12 tongue_protrudes mid line    Muscle Strength and Tone:  Normal upper extremity tone  Normal lower extremity tone  Normal upper extremity strength  Normal lower extremity strength    Coordination and Gait:  Gait antalgic with cane      Assessment & Plan:      1. Myasthenia " gravis  Overview:  Patient initally diagnosed in 2014.  Established with Dr. Billy February 2021.  Initial symptoms were ptosis and dysphagia.  Ultimately required vent for this flare.  He was initally started on mestinon, but did not tolerate (diarrhea).  He has been on Cellcept in the past.  Has been on Imuran and prednisone ever since.  Last flare was in April 2020 where he received IVIG during inpatient hospitalization.  Since establishing with Dr. Billy, his prednisone dose has been slowly weaned with goal of 10 mg daily. He is antibody positive.    Chest CT 6/11/2014-no evidence of thymoma    Antibodies 09/2021:  AChR binding 3.85  AChR blocking 47        Assessment & Plan:  -continue imuran and prednisone 10 mg daily  -Get LFTs    Orders:  -     Hepatic function panel; Future; Expected date: 09/19/2023          This note was created with the assistance of voice recognition software. There may be transcription errors as a result of using this technology however minimal. Effort has been made to assure accuracy of transcription but any obvious errors or omissions should be clarified with the author of the document.

## 2023-12-15 DIAGNOSIS — G70.00 MYASTHENIA GRAVIS: ICD-10-CM

## 2023-12-15 DIAGNOSIS — G51.4 FACIAL TWITCHING: ICD-10-CM

## 2023-12-18 RX ORDER — GABAPENTIN 300 MG/1
300 CAPSULE ORAL 3 TIMES DAILY
Qty: 270 CAPSULE | Refills: 3 | Status: SHIPPED | OUTPATIENT
Start: 2023-12-18

## 2023-12-18 RX ORDER — AZATHIOPRINE 50 MG/1
TABLET ORAL
Qty: 360 TABLET | Refills: 3 | Status: SHIPPED | OUTPATIENT
Start: 2023-12-18

## 2024-01-22 ENCOUNTER — HOSPITAL ENCOUNTER (OUTPATIENT)
Dept: RADIOLOGY | Facility: HOSPITAL | Age: 64
Discharge: HOME | End: 2024-01-22
Attending: INTERNAL MEDICINE
Payer: MEDICARE

## 2024-01-22 DIAGNOSIS — Z87.891 SMOKING HISTORY: ICD-10-CM

## 2024-01-22 PROCEDURE — 71271 CT THORAX LUNG CANCER SCR C-: CPT | Mod: TC

## 2024-01-23 LAB
CREAT SERPL-MCNC: 0.9 MG/DL (ref 0.5–1.4)
SAMPLE: NORMAL

## 2024-01-29 DIAGNOSIS — G70.00 MYASTHENIA GRAVIS: Chronic | ICD-10-CM

## 2024-01-30 RX ORDER — PREDNISONE 5 MG/1
TABLET ORAL
Qty: 180 TABLET | Refills: 3 | Status: SHIPPED | OUTPATIENT
Start: 2024-01-30

## 2024-04-09 ENCOUNTER — OFFICE VISIT (OUTPATIENT)
Dept: NEUROLOGY | Facility: CLINIC | Age: 64
End: 2024-04-09
Payer: MEDICARE

## 2024-04-09 VITALS
HEIGHT: 70 IN | WEIGHT: 315 LBS | DIASTOLIC BLOOD PRESSURE: 60 MMHG | HEART RATE: 80 BPM | SYSTOLIC BLOOD PRESSURE: 116 MMHG | BODY MASS INDEX: 45.1 KG/M2

## 2024-04-09 DIAGNOSIS — G70.00 MYASTHENIA GRAVIS: Primary | ICD-10-CM

## 2024-04-09 PROCEDURE — 4010F ACE/ARB THERAPY RXD/TAKEN: CPT | Mod: CPTII,S$GLB,, | Performed by: PSYCHIATRY & NEUROLOGY

## 2024-04-09 PROCEDURE — 99214 OFFICE O/P EST MOD 30 MIN: CPT | Mod: S$GLB,,, | Performed by: PSYCHIATRY & NEUROLOGY

## 2024-04-09 PROCEDURE — 3008F BODY MASS INDEX DOCD: CPT | Mod: CPTII,S$GLB,, | Performed by: PSYCHIATRY & NEUROLOGY

## 2024-04-09 PROCEDURE — 3051F HG A1C>EQUAL 7.0%<8.0%: CPT | Mod: CPTII,S$GLB,, | Performed by: PSYCHIATRY & NEUROLOGY

## 2024-04-09 PROCEDURE — 3074F SYST BP LT 130 MM HG: CPT | Mod: CPTII,S$GLB,, | Performed by: PSYCHIATRY & NEUROLOGY

## 2024-04-09 PROCEDURE — 1159F MED LIST DOCD IN RCRD: CPT | Mod: CPTII,S$GLB,, | Performed by: PSYCHIATRY & NEUROLOGY

## 2024-04-09 PROCEDURE — 99999 PR PBB SHADOW E&M-EST. PATIENT-LVL IV: CPT | Mod: PBBFAC,,, | Performed by: PSYCHIATRY & NEUROLOGY

## 2024-04-09 PROCEDURE — 3078F DIAST BP <80 MM HG: CPT | Mod: CPTII,S$GLB,, | Performed by: PSYCHIATRY & NEUROLOGY

## 2024-04-09 RX ORDER — PYRIDOSTIGMINE BROMIDE 60 MG/1
60 TABLET ORAL 2 TIMES DAILY
Qty: 60 TABLET | Refills: 11 | Status: SHIPPED | OUTPATIENT
Start: 2024-04-09 | End: 2025-04-09

## 2024-04-09 NOTE — PROGRESS NOTES
"Chief Complaint   Patient presents with    Myasthenia Gravis     Recently found out he had a UTI. Is having accumulation of mucous on chest. Denies any SOB, dysphagia, ptosis. States sometimes it strands him to walk. Legs get weak sometimes. Uses cane to ambulate. Denies any falls.         This is a 63 y.o. male here for follow up for myasthenia gravis.  Patient was recently diagnosed with a urinary tract infection in his planning on picking up her antibiotics today.  Has noted some abdominal pain.  Otherwise denies any dysarthria, diplopia, dysphagia.  Overall symptoms are very stable.  Recall he is on prednisone as well as Imuran 100 twice daily.  Recent CBC normal.  No recent liver function tests were seen.  He does report generalized fatigue.  He has take a nap every day at 11:30 a.m. which gives him much more energy.  He feels generally weak but no specific weakness in the bulbar area    Medication List with Changes/Refills   New Medications    PYRIDOSTIGMINE (MESTINON) 60 MG TAB    Take 1 tablet (60 mg total) by mouth 2 (two) times a day.   Current Medications    ACCU-CHEK GUIDE TEST STRIPS STRP    TEST BLOOD SUGAR ONE TIME DAILY    ALCOHOL SWABS (DROPSAFE ALCOHOL PREP PADS) PADM    USE TO TEST BLOOD SUGAR TWO TIMES DAILY    AMOXICILLIN-CLAVULANATE 875-125MG (AUGMENTIN) 875-125 MG PER TABLET    Take 1 tablet by mouth 2 (two) times a day. for 10 days    ASPIRIN 81 MG CHEW    CHEW AND SWALLOW 1 TABLET EVERY DAY    ATORVASTATIN (LIPITOR) 20 MG TABLET    TAKE 1 TABLET EVERY DAY    AZATHIOPRINE (IMURAN) 50 MG TAB    TAKE 2 TABLETS TWICE DAILY    BLOOD-GLUCOSE METER (ACCU-CHEK GUIDE GLUCOSE METER) MISC    USE AS DIRECTED.    BUSPIRONE (BUSPAR) 10 MG TABLET    TAKE 1 TABLET THREE TIMES DAILY (DOSE INCREASE)    DROPLET INSULIN SYR,HALF UNIT, 0.5 ML 31 GAUGE X 5/16" SYRG    USE TO INJECT INSULIN AS DIRECTED    DULOXETINE (CYMBALTA) 20 MG CAPSULE    TAKE 1 CAPSULE ONE TIME DAILY    ESOMEPRAZOLE (NEXIUM) 40 MG CAPSULE    " "TAKE 1 CAPSULE EVERY DAY    FINASTERIDE (PROSCAR) 5 MG TABLET    TAKE 1 TABLET EVERY DAY    FUROSEMIDE (LASIX) 40 MG TABLET    TAKE 1 TABLET ONE TIME DAILY    GABAPENTIN (NEURONTIN) 300 MG CAPSULE    TAKE 1 CAPSULE THREE TIMES DAILY    HYDROCODONE-ACETAMINOPHEN (NORCO)  MG PER TABLET    TAKE 1 TABLET BY MOUTH EVERY 8 HOURS AS NEEDED FOR PAIN FOR 30 DAYS    INSULIN GLARGINE (LANTUS U-100 INSULIN) 100 UNIT/ML INJECTION    INJECT 45 units in AM and 40 units at night    LANCETS (ACCU-CHEK SOFTCLIX LANCETS) MISC    TEST BLOOD SUGAR ONE TIME DAILY    LOSARTAN (COZAAR) 100 MG TABLET    TAKE 1 TABLET EVERY DAY    METFORMIN (GLUCOPHAGE) 1000 MG TABLET    TAKE 1 TABLET TWICE DAILY    PEN NEEDLE, DIABETIC (DROPLET PEN NEEDLE) 32 GAUGE X 5/32" NDLE    USE FOR INSULIN EVERY DAY    PIOGLITAZONE (ACTOS) 45 MG TABLET    TAKE 1 TABLET EVERY DAY    PREDNISONE (DELTASONE) 5 MG TABLET    TAKE 2 TABLETS ONE TIME DAILY    TAMSULOSIN (FLOMAX) 0.4 MG CAP    TAKE 1 CAPSULE AT BEDTIME    TRADJENTA 5 MG TAB TABLET    TAKE 1 TABLET EVERY DAY    VITAMIN D (VITAMIN D3) 1000 UNITS TAB    Take 1,000 Units by mouth once daily.        Vitals:    04/09/24 0837   BP: 116/60   Pulse: 80        General: alert and oriented, no acute distress, no audible wheezes, pulse intact, no edema    Cognition and Comprehension  Speech and language intact  Follows commands  Speech fluent  Attention intact  Memory for recent events intact from history taking  Affect pleasant  Fund of knowledge adequate    Cranial nerves  II. Optic: Visual fields full to confrontation both eyes  III, IV, VI. Oculomotor: Intact, Pupils equal, round and reactive to light, no nystagmus  V. Trigeminal: sensation to light touch normal  VII. No facial asymmetry or facial weakness  VIII. Hearing intact to spoken voice  IX/X. Glossopharyngeal/Vagus: Voice normal, palate rises symmetrically  XI. Axillary: Shoulder shrug normal  XII. Hypoglossal: Intact    Muscle Strength and Tone  Normal " upper extremity tone  Normal lower extremity tone  Normal upper extremity strength  Normal lower extremity strength    Sensation  Intact to light touch and temperature    Reflexes  Normal and symmetric    Coordination and Gait  Finger to nose normal  Gait normal     1. Myasthenia gravis  Overview:  Patient initally diagnosed in 2014.  Established with Dr. Billy February 2021.  Initial symptoms were ptosis and dysphagia.  Ultimately required vent for this flare.  He was initally started on mestinon, but did not tolerate (diarrhea).  He has been on Cellcept in the past.  Has been on Imuran and prednisone ever since.  Last flare was in April 2020 where he received IVIG during inpatient hospitalization.  Since establishing with Dr. Billy, his prednisone dose has been slowly weaned with goal of 10 mg daily. He is antibody positive.    Chest CT 6/11/2014-no evidence of thymoma    Antibodies 09/2021:  AChR binding 3.85  AChR blocking 47        Orders:  -     pyridostigmine (MESTINON) 60 mg Tab; Take 1 tablet (60 mg total) by mouth 2 (two) times a day.  Dispense: 60 tablet; Refill: 11  -     Comprehensive Metabolic Panel; Future     Retrial of Mestinon at a low dose

## 2024-05-17 ENCOUNTER — TELEPHONE (OUTPATIENT)
Dept: NEUROLOGY | Facility: CLINIC | Age: 64
End: 2024-05-17
Payer: MEDICARE

## 2024-05-17 NOTE — TELEPHONE ENCOUNTER
Wife states her  was restarted on mestinon  60 mg bid and mounjaro at the same time which was 3 week ago. He is having diarrhea. When he was on mestinon before he was having diarrhea. Please advise [phone]

## 2024-07-25 ENCOUNTER — HOSPITAL ENCOUNTER (OUTPATIENT)
Dept: RADIOLOGY | Facility: HOSPITAL | Age: 64
Discharge: HOME | End: 2024-07-25
Attending: INTERNAL MEDICINE
Payer: MEDICARE

## 2024-07-25 DIAGNOSIS — Z79.52 ON PREDNISONE THERAPY: ICD-10-CM

## 2024-07-25 PROCEDURE — 77080 DXA BONE DENSITY AXIAL: CPT | Mod: 26,,, | Performed by: STUDENT IN AN ORGANIZED HEALTH CARE EDUCATION/TRAINING PROGRAM

## 2024-07-25 PROCEDURE — 77080 DXA BONE DENSITY AXIAL: CPT | Mod: TC

## 2024-10-02 DIAGNOSIS — G70.00 MYASTHENIA GRAVIS: ICD-10-CM

## 2024-10-03 RX ORDER — AZATHIOPRINE 50 MG/1
TABLET ORAL
Qty: 360 TABLET | Refills: 3 | Status: SHIPPED | OUTPATIENT
Start: 2024-10-03

## 2024-10-14 ENCOUNTER — OFFICE VISIT (OUTPATIENT)
Dept: NEUROLOGY | Facility: CLINIC | Age: 64
End: 2024-10-14
Payer: MEDICARE

## 2024-10-14 VITALS
SYSTOLIC BLOOD PRESSURE: 125 MMHG | HEIGHT: 70 IN | BODY MASS INDEX: 45.1 KG/M2 | RESPIRATION RATE: 20 BRPM | DIASTOLIC BLOOD PRESSURE: 74 MMHG | WEIGHT: 315 LBS | HEART RATE: 78 BPM

## 2024-10-14 DIAGNOSIS — G70.00 MYASTHENIA GRAVIS: Primary | Chronic | ICD-10-CM

## 2024-10-14 PROCEDURE — 99999 PR PBB SHADOW E&M-EST. PATIENT-LVL IV: CPT | Mod: PBBFAC,,, | Performed by: NURSE PRACTITIONER

## 2024-10-14 PROCEDURE — 1160F RVW MEDS BY RX/DR IN RCRD: CPT | Mod: CPTII,S$GLB,, | Performed by: NURSE PRACTITIONER

## 2024-10-14 PROCEDURE — 3008F BODY MASS INDEX DOCD: CPT | Mod: CPTII,S$GLB,, | Performed by: NURSE PRACTITIONER

## 2024-10-14 PROCEDURE — 3078F DIAST BP <80 MM HG: CPT | Mod: CPTII,S$GLB,, | Performed by: NURSE PRACTITIONER

## 2024-10-14 PROCEDURE — 99214 OFFICE O/P EST MOD 30 MIN: CPT | Mod: S$GLB,,, | Performed by: NURSE PRACTITIONER

## 2024-10-14 PROCEDURE — 3074F SYST BP LT 130 MM HG: CPT | Mod: CPTII,S$GLB,, | Performed by: NURSE PRACTITIONER

## 2024-10-14 PROCEDURE — 1159F MED LIST DOCD IN RCRD: CPT | Mod: CPTII,S$GLB,, | Performed by: NURSE PRACTITIONER

## 2024-10-14 PROCEDURE — 3044F HG A1C LEVEL LT 7.0%: CPT | Mod: CPTII,S$GLB,, | Performed by: NURSE PRACTITIONER

## 2024-10-14 PROCEDURE — 4010F ACE/ARB THERAPY RXD/TAKEN: CPT | Mod: CPTII,S$GLB,, | Performed by: NURSE PRACTITIONER

## 2024-10-14 RX ORDER — AZATHIOPRINE 50 MG/1
100 TABLET ORAL 2 TIMES DAILY
Qty: 360 TABLET | Refills: 3 | Status: SHIPPED | OUTPATIENT
Start: 2024-10-14

## 2024-10-14 RX ORDER — ERYTHROMYCIN 5 MG/G
OINTMENT OPHTHALMIC
COMMUNITY
Start: 2024-09-26

## 2024-10-14 RX ORDER — PREDNISONE 5 MG/1
10 TABLET ORAL DAILY
Qty: 180 TABLET | Refills: 3 | Status: SHIPPED | OUTPATIENT
Start: 2024-10-14

## 2024-10-14 RX ORDER — NEOMYCIN SULFATE, POLYMYXIN B SULFATE AND DEXAMETHASONE 3.5; 10000; 1 MG/ML; [USP'U]/ML; MG/ML
SUSPENSION/ DROPS OPHTHALMIC
COMMUNITY
Start: 2024-09-26

## 2024-10-14 NOTE — PROGRESS NOTES
Subjective:       Patient ID: Wilton Chavez is a 64 y.o. male.    Chief Complaint: 6 month follow up dx: MG TR (Pt mention weakness.(No change ) / Pt mention no fatigue . No abdominal pain . According to the pt, he is experiencing general betterment ./Pt needs refill on Gabapentin .)      History of Present Illness:  Follow-up visit for myasthenia gravis.  At last visit, he was started on a retrial of Mestinon.  Tells me it caused severe GI upset as it had originally many years ago.  He stopped the medication.  He is still on prednisone 10 mg daily and Imuran 100 mg b.i.d..  He tells me he is still doing really well from a myasthenia perspective.  He denies any ptosis, diplopia, dysphagia, dysarthria, shortness of breath.  He does not feel he is weak at all.  He had labs earlier this month.  LFTs normal, CBC normal.              Past Medical History:   Diagnosis Date    Carotid artery stenosis 07/06/2022    Diabetes mellitus 06/22/2022    Generalized anxiety disorder 07/06/2022    History of carpal tunnel release     History of esophagogastroduodenoscopy (EGD)     Hx of colonoscopy     Hypertension 06/22/2022    Iron deficiency anemia 07/06/2022    Myasthenia gravis 06/22/2022    Myasthenia gravis     Obesity 06/22/2022    Obstructive sleep apnea syndrome 06/22/2022    Ocular hypertension 06/22/2022    Personal history of colonic polyps 06/25/2020    Pure hypercholesterolemia 06/22/2022    Reactive thrombocytosis 07/06/2022    Recurrent major depressive disorder, in full remission 07/06/2022    S/P tendon repair     Spinal stenosis of lumbar region without neurogenic claudication 07/06/2022    Tobacco use disorder 06/22/2022    Tobacco user 06/22/2022    Urinary tract infection, site not specified        Past Surgical History:   Procedure Laterality Date    COLONOSCOPY      ESOPHAGOGASTRODUODENOSCOPY      EXTRACTION OF CATARACT      TENDON REPAIR          Family History   Problem Relation Name Age of Onset     "Heart disease Mother      Asthma Father      COPD Father      Emphysema Father      Sleep apnea Father      Cancer Brother      Diabetes Brother          Social History     Socioeconomic History    Marital status:    Tobacco Use    Smoking status: Every Day     Current packs/day: 1.00     Types: Cigarettes    Smokeless tobacco: Never   Substance and Sexual Activity    Alcohol use: Not Currently    Drug use: Yes     Types: Hydrocodone        Outpatient Encounter Medications as of 10/14/2024   Medication Sig Dispense Refill    ACCU-CHEK GUIDE TEST STRIPS Strp TEST BLOOD SUGAR ONE TIME DAILY 100 strip 3    alcohol swabs (DROPSAFE ALCOHOL PREP PADS) PadM USE TO TEST BLOOD SUGAR TWO TIMES DAILY 100 each 12    aspirin 81 MG Chew CHEW AND SWALLOW 1 TABLET EVERY DAY 90 tablet 3    atorvastatin (LIPITOR) 20 MG tablet TAKE 1 TABLET EVERY DAY 90 tablet 3    baclofen (LIORESAL) 10 MG tablet Take 10 mg by mouth every evening.      blood-glucose meter (ACCU-CHEK GUIDE GLUCOSE METER) [facility] USE AS DIRECTED. 1 each 3    busPIRone (BUSPAR) 10 MG tablet TAKE 1 TABLET THREE TIMES DAILY (DOSE INCREASE) 270 tablet 3    DROPLET INSULIN SYR,HALF UNIT, 0.5 mL 31 gauge x 5/16" Syrg USE TO INJECT INSULIN AS DIRECTED 200 each 3    DULoxetine (CYMBALTA) 20 MG capsule TAKE 1 CAPSULE ONE TIME DAILY 90 capsule 3    erythromycin (ROMYCIN) ophthalmic ointment SMARTSIG:Sparingly In Eye(s) Every Night      esomeprazole (NEXIUM) 40 MG capsule TAKE 1 CAPSULE EVERY DAY 90 capsule 3    ferrous gluconate (FERGON) 324 MG tablet Take 1 tablet (324 mg total) by mouth daily with breakfast. 90 tablet 3    finasteride (PROSCAR) 5 mg tablet TAKE 1 TABLET EVERY DAY 90 tablet 3    furosemide (LASIX) 40 MG tablet TAKE 1 TABLET ONE TIME DAILY 90 tablet 3    gabapentin (NEURONTIN) 300 MG capsule TAKE 1 CAPSULE THREE TIMES DAILY 270 capsule 3    HYDROcodone-acetaminophen (NORCO)  mg per tablet TAKE 1 TABLET BY MOUTH EVERY 8 HOURS AS NEEDED FOR PAIN FOR 30 " "DAYS      insulin glargine U-100, Lantus, (LANTUS U-100 INSULIN) 100 unit/mL injection INJECT 45 UNITS UNDER THE SKIN IN THE MORNING AND 40 UNITS AT NIGHT 80 mL 3    lancets (ACCU-CHEK SOFTCLIX LANCETS) Misc TEST BLOOD SUGAR ONE TIME DAILY 100 each 10    linaGLIPtin (TRADJENTA) 5 mg Tab tablet Take 1 tablet (5 mg total) by mouth once daily. 90 tablet 3    losartan (COZAAR) 100 MG tablet TAKE 1 TABLET EVERY DAY 90 tablet 10    metFORMIN (GLUCOPHAGE) 1000 MG tablet TAKE 1 TABLET TWICE DAILY 180 tablet 3    neomycin-polymyxin-dexamethasone (MAXITROL) 3.5mg/mL-10,000 unit/mL-0.1 % DrpS Place into both eyes.      pen needle, diabetic (DROPLET PEN NEEDLE) 32 gauge x 5/32" Ndle USE FOR INSULIN EVERY  each 12    pioglitazone (ACTOS) 45 MG tablet TAKE 1 TABLET EVERY DAY 90 tablet 3    tamsulosin (FLOMAX) 0.4 mg Cap TAKE 1 CAPSULE AT BEDTIME 90 capsule 3    vitamin D (VITAMIN D3) 1000 units Tab Take 1,000 Units by mouth once daily.      [DISCONTINUED] azaTHIOprine (IMURAN) 50 mg Tab TAKE 2 TABLETS TWICE DAILY 360 tablet 3    [DISCONTINUED] predniSONE (DELTASONE) 5 MG tablet TAKE 2 TABLETS ONE TIME DAILY 180 tablet 3    [DISCONTINUED] pyridostigmine (MESTINON) 60 mg Tab Take 1 tablet (60 mg total) by mouth 2 (two) times a day. 60 tablet 11    azaTHIOprine (IMURAN) 50 mg Tab Take 2 tablets (100 mg total) by mouth 2 (two) times daily. 360 tablet 3    predniSONE (DELTASONE) 5 MG tablet Take 2 tablets (10 mg total) by mouth once daily. 180 tablet 3    [DISCONTINUED] azaTHIOprine (IMURAN) 50 mg Tab TAKE 2 TABLETS TWICE DAILY 360 tablet 3    [DISCONTINUED] finasteride (PROSCAR) 5 mg tablet TAKE 1 TABLET EVERY DAY 90 tablet 3     No facility-administered encounter medications on file as of 10/14/2024.      Objective:   /74   Pulse 78   Resp 20   Ht 5' 10" (1.778 m)   Wt (!) 144.7 kg (319 lb)   BMI 45.77 kg/m²        Physical Exam  General:  Alert and oriented  NAD  No overt edema    Cognition and " Comprehension:  Speech and language intact  Follows commands    Cranial nerves:   CN 2_ Visual fields (full to confrontation both eyes)  CN 3, 4, 6_ Intact, MADHAV, no nystagmus  CN 5_facial tactile sensation intact  CN 7_no face asymmetry; normal eye closure and smile  CN 8_hearing intact to spoken voice  CN 9, 10, 11_voice normal, shoulder shrug ok; deltoids not fatigable   CN 12 tongue_protrudes mid line    Muscle Strength and Tone:  Normal upper extremity tone  Normal lower extremity tone  Normal upper extremity strength  Normal lower extremity strength    Reflexes:  Normal and symmetric    Sensation:  Intact to light touch and temperature    Coordination and Gait:  Coordination and gait are normal       Assessment & Plan:      1. Myasthenia gravis  Overview:  Patient initally diagnosed in 2014.  Established with Dr. Billy February 2021.  Initial symptoms were ptosis and dysphagia.  Ultimately required vent for this flare.  He was initally started on mestinon, but did not tolerate (diarrhea).  He has been on Cellcept in the past.  Has been on Imuran and prednisone ever since.  Last flare was in April 2020 where he received IVIG during inpatient hospitalization.  Since establishing with Dr. Billy, his prednisone dose has been slowly weaned with goal of 10 mg daily. He is antibody positive.    Chest CT 6/11/2014-no evidence of thymoma    Antibodies 09/2021:  AChR binding 3.85  AChR blocking 47        Assessment & Plan:  -again had sig GI side effects with mestinon.  Continue imuran and prednisone    Orders:  -     predniSONE (DELTASONE) 5 MG tablet; Take 2 tablets (10 mg total) by mouth once daily.  Dispense: 180 tablet; Refill: 3  -     azaTHIOprine (IMURAN) 50 mg Tab; Take 2 tablets (100 mg total) by mouth 2 (two) times daily.  Dispense: 360 tablet; Refill: 3          This note was created with the assistance of voice recognition software. There may be transcription errors as a result of using this technology  however minimal. Effort has been made to assure accuracy of transcription but any obvious errors or omissions should be clarified with the author of the document.

## 2024-12-16 ENCOUNTER — HOSPITAL ENCOUNTER (EMERGENCY)
Facility: HOSPITAL | Age: 64
Discharge: HOME | End: 2024-12-16
Attending: EMERGENCY MEDICINE
Payer: MEDICARE

## 2024-12-16 VITALS
WEIGHT: 315 LBS | RESPIRATION RATE: 20 BRPM | DIASTOLIC BLOOD PRESSURE: 59 MMHG | SYSTOLIC BLOOD PRESSURE: 113 MMHG | TEMPERATURE: 98 F | HEIGHT: 71 IN | OXYGEN SATURATION: 95 % | HEART RATE: 98 BPM | BODY MASS INDEX: 44.1 KG/M2

## 2024-12-16 DIAGNOSIS — R05.9 COUGH: ICD-10-CM

## 2024-12-16 DIAGNOSIS — R07.9 CHEST PAIN: ICD-10-CM

## 2024-12-16 DIAGNOSIS — J10.1 INFLUENZA A: Primary | ICD-10-CM

## 2024-12-16 LAB
ALBUMIN SERPL-MCNC: 3.5 G/DL (ref 3.4–4.8)
ALBUMIN/GLOB SERPL: 0.9 RATIO (ref 1.1–2)
ALP SERPL-CCNC: 74 UNIT/L (ref 40–150)
ALT SERPL-CCNC: 21 UNIT/L (ref 0–55)
ANION GAP SERPL CALC-SCNC: 11 MEQ/L
AST SERPL-CCNC: 19 UNIT/L (ref 5–34)
BASOPHILS # BLD AUTO: 0.03 X10(3)/MCL
BASOPHILS NFR BLD AUTO: 0.4 %
BILIRUB SERPL-MCNC: 0.7 MG/DL
BNP BLD-MCNC: 31.2 PG/ML
BUN SERPL-MCNC: 21.6 MG/DL (ref 8.4–25.7)
CALCIUM SERPL-MCNC: 9.8 MG/DL (ref 8.8–10)
CHLORIDE SERPL-SCNC: 103 MMOL/L (ref 98–107)
CO2 SERPL-SCNC: 27 MMOL/L (ref 23–31)
CREAT SERPL-MCNC: 1.25 MG/DL (ref 0.72–1.25)
CREAT/UREA NIT SERPL: 17
EOSINOPHIL # BLD AUTO: 0.03 X10(3)/MCL (ref 0–0.9)
EOSINOPHIL NFR BLD AUTO: 0.4 %
ERYTHROCYTE [DISTWIDTH] IN BLOOD BY AUTOMATED COUNT: 16 % (ref 11.5–17)
FLUAV AG UPPER RESP QL IA.RAPID: DETECTED
FLUBV AG UPPER RESP QL IA.RAPID: NOT DETECTED
GFR SERPLBLD CREATININE-BSD FMLA CKD-EPI: >60 ML/MIN/1.73/M2
GLOBULIN SER-MCNC: 3.8 GM/DL (ref 2.4–3.5)
GLUCOSE SERPL-MCNC: 93 MG/DL (ref 82–115)
HCT VFR BLD AUTO: 39.9 % (ref 42–52)
HGB BLD-MCNC: 13.1 G/DL (ref 14–18)
IMM GRANULOCYTES # BLD AUTO: 0.04 X10(3)/MCL (ref 0–0.04)
IMM GRANULOCYTES NFR BLD AUTO: 0.5 %
INR PPP: 1 (ref 2–3)
LYMPHOCYTES # BLD AUTO: 0.59 X10(3)/MCL (ref 0.6–4.6)
LYMPHOCYTES NFR BLD AUTO: 7.8 %
MCH RBC QN AUTO: 32.8 PG (ref 27–31)
MCHC RBC AUTO-ENTMCNC: 32.8 G/DL (ref 33–36)
MCV RBC AUTO: 99.8 FL (ref 80–94)
MONOCYTES # BLD AUTO: 0.64 X10(3)/MCL (ref 0.1–1.3)
MONOCYTES NFR BLD AUTO: 8.4 %
NEUTROPHILS # BLD AUTO: 6.26 X10(3)/MCL (ref 2.1–9.2)
NEUTROPHILS NFR BLD AUTO: 82.5 %
NRBC BLD AUTO-RTO: 0 %
PLATELET # BLD AUTO: 266 X10(3)/MCL (ref 130–400)
PMV BLD AUTO: 10 FL (ref 7.4–10.4)
POTASSIUM SERPL-SCNC: 4.1 MMOL/L (ref 3.5–5.1)
PROT SERPL-MCNC: 7.3 GM/DL (ref 5.8–7.6)
PROTHROMBIN TIME: 13.4 SECONDS (ref 11.7–14.5)
RBC # BLD AUTO: 4 X10(6)/MCL (ref 4.7–6.1)
RSV A 5' UTR RNA NPH QL NAA+PROBE: NOT DETECTED
SARS-COV-2 RNA RESP QL NAA+PROBE: NOT DETECTED
SODIUM SERPL-SCNC: 141 MMOL/L (ref 136–145)
TROPONIN I SERPL-MCNC: <0.01 NG/ML (ref 0–0.04)
WBC # BLD AUTO: 7.59 X10(3)/MCL (ref 4.5–11.5)

## 2024-12-16 PROCEDURE — 96374 THER/PROPH/DIAG INJ IV PUSH: CPT

## 2024-12-16 PROCEDURE — 84484 ASSAY OF TROPONIN QUANT: CPT | Performed by: EMERGENCY MEDICINE

## 2024-12-16 PROCEDURE — 99285 EMERGENCY DEPT VISIT HI MDM: CPT | Mod: 25

## 2024-12-16 PROCEDURE — 85610 PROTHROMBIN TIME: CPT | Performed by: EMERGENCY MEDICINE

## 2024-12-16 PROCEDURE — 94760 N-INVAS EAR/PLS OXIMETRY 1: CPT

## 2024-12-16 PROCEDURE — 63600175 PHARM REV CODE 636 W HCPCS: Performed by: EMERGENCY MEDICINE

## 2024-12-16 PROCEDURE — 93005 ELECTROCARDIOGRAM TRACING: CPT

## 2024-12-16 PROCEDURE — 99900031 HC PATIENT EDUCATION (STAT)

## 2024-12-16 PROCEDURE — 94640 AIRWAY INHALATION TREATMENT: CPT

## 2024-12-16 PROCEDURE — 99900035 HC TECH TIME PER 15 MIN (STAT)

## 2024-12-16 PROCEDURE — 83880 ASSAY OF NATRIURETIC PEPTIDE: CPT | Performed by: EMERGENCY MEDICINE

## 2024-12-16 PROCEDURE — 0241U COVID/RSV/FLU A&B PCR: CPT | Performed by: EMERGENCY MEDICINE

## 2024-12-16 PROCEDURE — 27000221 HC OXYGEN, UP TO 24 HOURS

## 2024-12-16 PROCEDURE — 25000242 PHARM REV CODE 250 ALT 637 W/ HCPCS: Performed by: EMERGENCY MEDICINE

## 2024-12-16 PROCEDURE — 93010 ELECTROCARDIOGRAM REPORT: CPT | Mod: ,,, | Performed by: INTERNAL MEDICINE

## 2024-12-16 PROCEDURE — 80053 COMPREHEN METABOLIC PANEL: CPT | Performed by: EMERGENCY MEDICINE

## 2024-12-16 PROCEDURE — 85025 COMPLETE CBC W/AUTO DIFF WBC: CPT | Performed by: EMERGENCY MEDICINE

## 2024-12-16 RX ORDER — OSELTAMIVIR PHOSPHATE 75 MG/1
75 CAPSULE ORAL 2 TIMES DAILY
Qty: 10 CAPSULE | Refills: 0 | Status: SHIPPED | OUTPATIENT
Start: 2024-12-16 | End: 2024-12-21

## 2024-12-16 RX ORDER — IPRATROPIUM BROMIDE AND ALBUTEROL SULFATE 2.5; .5 MG/3ML; MG/3ML
3 SOLUTION RESPIRATORY (INHALATION)
Status: COMPLETED | OUTPATIENT
Start: 2024-12-16 | End: 2024-12-16

## 2024-12-16 RX ORDER — BENZONATATE 200 MG/1
200 CAPSULE ORAL 3 TIMES DAILY PRN
Qty: 30 CAPSULE | Refills: 0 | Status: SHIPPED | OUTPATIENT
Start: 2024-12-16 | End: 2024-12-26

## 2024-12-16 RX ORDER — DEXAMETHASONE SODIUM PHOSPHATE 4 MG/ML
8 INJECTION, SOLUTION INTRA-ARTICULAR; INTRALESIONAL; INTRAMUSCULAR; INTRAVENOUS; SOFT TISSUE
Status: COMPLETED | OUTPATIENT
Start: 2024-12-16 | End: 2024-12-16

## 2024-12-16 RX ORDER — ALBUTEROL SULFATE 90 UG/1
2 INHALANT RESPIRATORY (INHALATION) EVERY 4 HOURS PRN
Qty: 6.7 G | Refills: 0 | Status: SHIPPED | OUTPATIENT
Start: 2024-12-16 | End: 2025-12-16

## 2024-12-16 RX ADMIN — IPRATROPIUM BROMIDE AND ALBUTEROL SULFATE 3 ML: 2.5; .5 SOLUTION RESPIRATORY (INHALATION) at 08:12

## 2024-12-16 RX ADMIN — DEXAMETHASONE SODIUM PHOSPHATE 8 MG: 4 INJECTION, SOLUTION INTRA-ARTICULAR; INTRALESIONAL; INTRAMUSCULAR; INTRAVENOUS; SOFT TISSUE at 08:12

## 2024-12-16 NOTE — ED TRIAGE NOTES
Pt complaint of worsening cough cough with shortness of breath gloria at exercition, nasal congestion over the past week that is not improving

## 2024-12-16 NOTE — ED PROVIDER NOTES
Encounter Date: 12/16/2024       History     Chief Complaint   Patient presents with    Cough     Pt complaint of worsening cough cough with shortness of breath gloria at exercition, nasal congestion over the past week that is not improving     The history is provided by the patient.   Cough  This is a new problem. The current episode started several days ago. The problem occurs constantly. The problem has been unchanged. The cough is Productive of sputum. There has been no fever. Associated symptoms include chest pain, headaches, myalgias and shortness of breath. Pertinent negatives include no sore throat. He has tried nothing for the symptoms. He is a smoker.   Patient has received COVID and flu vaccines recently.      Review of patient's allergies indicates:  No Known Allergies  Past Medical History:   Diagnosis Date    Carotid artery stenosis 07/06/2022    Diabetes mellitus 06/22/2022    Generalized anxiety disorder 07/06/2022    History of carpal tunnel release     History of esophagogastroduodenoscopy (EGD)     Hx of colonoscopy     Hypertension 06/22/2022    Iron deficiency anemia 07/06/2022    Myasthenia gravis 06/22/2022    Myasthenia gravis     Obesity 06/22/2022    Obstructive sleep apnea syndrome 06/22/2022    Ocular hypertension 06/22/2022    Personal history of colonic polyps 06/25/2020    Pure hypercholesterolemia 06/22/2022    Reactive thrombocytosis 07/06/2022    Recurrent major depressive disorder, in full remission 07/06/2022    S/P tendon repair     Spinal stenosis of lumbar region without neurogenic claudication 07/06/2022    Tobacco use disorder 06/22/2022    Tobacco user 06/22/2022    Urinary tract infection, site not specified      Past Surgical History:   Procedure Laterality Date    COLONOSCOPY      ESOPHAGOGASTRODUODENOSCOPY      EXTRACTION OF CATARACT      TENDON REPAIR       Family History   Problem Relation Name Age of Onset    Heart disease Mother      Asthma Father      COPD Father       Emphysema Father      Sleep apnea Father      Cancer Brother      Diabetes Brother       Social History     Tobacco Use    Smoking status: Every Day     Current packs/day: 1.00     Types: Cigarettes    Smokeless tobacco: Never   Substance Use Topics    Alcohol use: Not Currently    Drug use: Yes     Types: Hydrocodone     Review of Systems   Constitutional:  Negative for fever.   HENT:  Negative for sore throat.    Respiratory:  Positive for cough and shortness of breath.    Cardiovascular:  Positive for chest pain.   Gastrointestinal:  Negative for nausea.   Genitourinary:  Negative for dysuria.   Musculoskeletal:  Positive for myalgias. Negative for back pain.   Skin:  Negative for rash.   Neurological:  Positive for headaches. Negative for weakness.   Hematological:  Does not bruise/bleed easily.       Physical Exam     Initial Vitals   BP Pulse Resp Temp SpO2   12/16/24 0747 12/16/24 0736 12/16/24 0736 12/16/24 0736 12/16/24 0736   106/68 103 (!) 24 98.1 °F (36.7 °C) (!) 91 %      MAP       --                Physical Exam    Nursing note and vitals reviewed.  Constitutional: He appears well-developed and well-nourished.   HENT:   Head: Normocephalic and atraumatic.   Right Ear: External ear normal.   Left Ear: External ear normal.   Nose: Nose normal.   Eyes: Conjunctivae and EOM are normal. Pupils are equal, round, and reactive to light.   Neck: Neck supple.   Normal range of motion.  Cardiovascular:  Normal rate, regular rhythm, normal heart sounds and intact distal pulses.           Pulmonary/Chest: He has wheezes in the left lower field.   Abdominal: Abdomen is soft. Bowel sounds are normal.   obese   Musculoskeletal:         General: Normal range of motion.      Cervical back: Normal range of motion and neck supple.     Neurological: He is alert and oriented to person, place, and time. He has normal strength. GCS score is 15. GCS eye subscore is 4. GCS verbal subscore is 5. GCS motor subscore is 6.   Skin:  Skin is warm and dry. Capillary refill takes less than 2 seconds.   Psychiatric: He has a normal mood and affect. His behavior is normal. Judgment and thought content normal.         ED Course   Procedures  Labs Reviewed   COVID/RSV/FLU A&B PCR - Abnormal       Result Value    Influenza A PCR Detected (*)     Influenza B PCR Not Detected      Respiratory Syncytial Virus PCR Not Detected      SARS-CoV-2 PCR Not Detected      Narrative:     The Xpert Xpress SARS-CoV-2/FLU/RSV plus is a rapid, multiplexed real-time PCR test intended for the simultaneous qualitative detection and differentiation of SARS-CoV-2, Influenza A, Influenza B, and respiratory syncytial virus (RSV) viral RNA in either nasopharyngeal swab or nasal swab specimens.         COMPREHENSIVE METABOLIC PANEL - Abnormal    Sodium 141      Potassium 4.1      Chloride 103      CO2 27      Glucose 93      Blood Urea Nitrogen 21.6      Creatinine 1.25      Calcium 9.8      Protein Total 7.3      Albumin 3.5      Globulin 3.8 (*)     Albumin/Globulin Ratio 0.9 (*)     Bilirubin Total 0.7      ALP 74      ALT 21      AST 19      eGFR >60      Anion Gap 11.0      BUN/Creatinine Ratio 17     PROTIME-INR - Abnormal    PT 13.4      INR 1.0 (*)    CBC WITH DIFFERENTIAL - Abnormal    WBC 7.59      RBC 4.00 (*)     Hgb 13.1 (*)     Hct 39.9 (*)     MCV 99.8 (*)     MCH 32.8 (*)     MCHC 32.8 (*)     RDW 16.0      Platelet 266      MPV 10.0      Neut % 82.5      Lymph % 7.8      Mono % 8.4      Eos % 0.4      Basophil % 0.4      Lymph # 0.59 (*)     Neut # 6.26      Mono # 0.64      Eos # 0.03      Baso # 0.03      IG# 0.04      IG% 0.5      NRBC% 0.0     B-TYPE NATRIURETIC PEPTIDE - Normal    Natriuretic Peptide 31.2     TROPONIN I - Normal    Troponin-I <0.010     CBC W/ AUTO DIFFERENTIAL    Narrative:     The following orders were created for panel order CBC auto differential.  Procedure                               Abnormality         Status                      ---------                               -----------         ------                     CBC with Differential[[id number]]       Abnormal            Final result                 Please view results for these tests on the individual orders.     EKG Readings: (Independently Interpreted)   Initial Reading: No STEMI. Rhythm: Sinus Tachycardia. Heart Rate: 115. Ectopy: PACs. Conduction: Normal. ST Segments: Normal ST Segments. T Waves: Normal. Axis: Right Axis Deviation. Clinical Impression: Sinus Tachycardia with PACs       Imaging Results              X-Ray Chest PA And Lateral (Final result)  Result time 12/16/24 08:12:39      Final result by Muna Young MD (12/16/24 08:12:39)                   Impression:      No acute abnormality of the chest.      Electronically signed by: Muna Young  Date:    12/16/2024  Time:    08:12               Narrative:    EXAMINATION:  XR CHEST PA AND LATERAL    CLINICAL HISTORY:  Cough, unspecified    TECHNIQUE:  PA and lateral chest radiographs    COMPARISON:  Chest x-ray dated 12/01/2000    FINDINGS:  The heart is normal in size.  There is no focal airspace consolidation.  There is no pleural effusion or visible pneumothorax.                        Wet Read by Guido Walters MD (12/16/24 08:05:45, [facility] - Emergency Dept, Emergency Medicine)    Flattening of diaphragm suggestive of COPD; no consolidations, no infiltrates                                     Medications   albuterol-ipratropium 2.5 mg-0.5 mg/3 mL nebulizer solution 3 mL (3 mLs Nebulization Given 12/16/24 0811)   dexAMETHasone injection 8 mg (8 mg Intravenous Given 12/16/24 0858)     Medical Decision Making  Amount and/or Complexity of Data Reviewed  Labs: ordered. Decision-making details documented in ED Course.  Radiology: ordered and independent interpretation performed.  ECG/medicine tests: ordered and independent interpretation performed. Decision-making details  documented in ED Course.    Risk  Prescription drug management.    Differential includes:  viral URI, pneumonia, COVID, flu, RSV, CHF, AECB, PTX, allergies.  Will obtain CBC, CMP, BNP, troponin, EKG, CXR, COVID/flu/RSV and give DuoNeb and steroids.           ED Course as of 12/16/24 0858   Mon Dec 16, 2024   0852 Discussed results with patient.  Will treat for influenza. [CL]      ED Course User Index  [CL] Guido Walters MD                           Clinical Impression:  Final diagnoses:  [R05.9] Cough  [R07.9] Chest pain  [J10.1] Influenza A (Primary)          ED Disposition Condition    Discharge Stable          ED Prescriptions       Medication Sig Dispense Start Date End Date Auth. Provider    oseltamivir (TAMIFLU) 75 MG capsule Take 1 capsule (75 mg total) by mouth 2 (two) times daily. for 5 days 10 capsule 12/16/2024 12/21/2024 Guido Walters MD    benzonatate (TESSALON) 200 MG capsule Take 1 capsule (200 mg total) by mouth 3 (three) times daily as needed for Cough. 30 capsule 12/16/2024 12/26/2024 Guido Walters MD    albuterol (PROVENTIL/VENTOLIN HFA) 90 mcg/actuation inhaler Inhale 2 puffs into the lungs every 4 (four) hours as needed for Wheezing or Shortness of Breath. Rescue 6.7 g 12/16/2024 12/16/2025 Guido Walters MD          Follow-up Information       Follow up With Specialties Details Why Contact Info    Km Mast MD Internal Medicine Schedule an appointment as soon as possible for a visit   [place] [id number]  [phone]               Guido Walters MD  12/16/24 3741

## 2024-12-19 LAB
OHS QRS DURATION: 78 MS
OHS QTC CALCULATION: 453 MS

## 2025-01-27 ENCOUNTER — HOSPITAL ENCOUNTER (OUTPATIENT)
Dept: RADIOLOGY | Facility: HOSPITAL | Age: 65
Discharge: HOME | End: 2025-01-27
Attending: INTERNAL MEDICINE
Payer: MEDICARE

## 2025-01-27 DIAGNOSIS — Z87.891 PERSONAL HISTORY OF SMOKING: ICD-10-CM

## 2025-01-27 PROCEDURE — 71271 CT THORAX LUNG CANCER SCR C-: CPT | Mod: TC

## 2025-04-07 DIAGNOSIS — G70.00 MYASTHENIA GRAVIS: Chronic | ICD-10-CM

## 2025-04-07 RX ORDER — AZATHIOPRINE 50 MG/1
100 TABLET ORAL 2 TIMES DAILY
Qty: 360 TABLET | Refills: 3 | Status: SHIPPED | OUTPATIENT
Start: 2025-04-07

## 2025-04-25 ENCOUNTER — HOSPITAL ENCOUNTER (OUTPATIENT)
Dept: RADIOLOGY | Facility: HOSPITAL | Age: 65
Discharge: HOME | End: 2025-04-25
Attending: INTERNAL MEDICINE
Payer: MEDICARE

## 2025-04-25 DIAGNOSIS — R91.1 RIGHT LOWER LOBE PULMONARY NODULE: ICD-10-CM

## 2025-04-25 PROCEDURE — 71250 CT THORAX DX C-: CPT | Mod: TC

## 2025-05-06 ENCOUNTER — OFFICE VISIT (OUTPATIENT)
Dept: NEUROLOGY | Facility: CLINIC | Age: 65
End: 2025-05-06
Payer: MEDICARE

## 2025-05-06 VITALS
SYSTOLIC BLOOD PRESSURE: 122 MMHG | DIASTOLIC BLOOD PRESSURE: 82 MMHG | WEIGHT: 310 LBS | HEIGHT: 70 IN | BODY MASS INDEX: 44.38 KG/M2 | HEART RATE: 53 BPM

## 2025-05-06 DIAGNOSIS — G70.00 MYASTHENIA GRAVIS: Chronic | ICD-10-CM

## 2025-05-06 PROCEDURE — 99214 OFFICE O/P EST MOD 30 MIN: CPT | Mod: S$GLB,,, | Performed by: PSYCHIATRY & NEUROLOGY

## 2025-05-06 PROCEDURE — 3066F NEPHROPATHY DOC TX: CPT | Mod: CPTII,S$GLB,, | Performed by: PSYCHIATRY & NEUROLOGY

## 2025-05-06 PROCEDURE — 3074F SYST BP LT 130 MM HG: CPT | Mod: CPTII,S$GLB,, | Performed by: PSYCHIATRY & NEUROLOGY

## 2025-05-06 PROCEDURE — 3061F NEG MICROALBUMINURIA REV: CPT | Mod: CPTII,S$GLB,, | Performed by: PSYCHIATRY & NEUROLOGY

## 2025-05-06 PROCEDURE — 99999 PR PBB SHADOW E&M-EST. PATIENT-LVL II: CPT | Mod: PBBFAC,,, | Performed by: PSYCHIATRY & NEUROLOGY

## 2025-05-06 PROCEDURE — 3008F BODY MASS INDEX DOCD: CPT | Mod: CPTII,S$GLB,, | Performed by: PSYCHIATRY & NEUROLOGY

## 2025-05-06 PROCEDURE — 3044F HG A1C LEVEL LT 7.0%: CPT | Mod: CPTII,S$GLB,, | Performed by: PSYCHIATRY & NEUROLOGY

## 2025-05-06 PROCEDURE — 3079F DIAST BP 80-89 MM HG: CPT | Mod: CPTII,S$GLB,, | Performed by: PSYCHIATRY & NEUROLOGY

## 2025-05-06 PROCEDURE — 1159F MED LIST DOCD IN RCRD: CPT | Mod: CPTII,S$GLB,, | Performed by: PSYCHIATRY & NEUROLOGY

## 2025-05-06 RX ORDER — PREDNISONE 5 MG/1
TABLET ORAL
Qty: 180 TABLET | Refills: 3 | Status: SHIPPED | OUTPATIENT
Start: 2025-05-06

## 2025-05-06 NOTE — PROGRESS NOTES
"Chief Complaint   Patient presents with    MG     Pt states he is doing well denies changes feels he is stable         This is a 64 y.o. male here for follow up for   Myasthenia gravis.  Patient is doing well.  He remains off Mestinon  Due to GI side effects. and is on prednisone 10 mg daily and Imuran 100 mg twice daily.  He underwent labs at the end of April which showed normal CBC and LFTs.  He denies any ptosis, diplopia, dysphagia, dysarthria, shortness of breath.  He denies weakness.    MG ADL 1    Medication List with Changes/Refills   Current Medications    ACCU-CHEK GUIDE TEST STRIPS STRP    TEST BLOOD SUGAR ONE TIME DAILY    ALBUTEROL (PROVENTIL/VENTOLIN HFA) 90 MCG/ACTUATION INHALER    Inhale 2 puffs into the lungs every 4 (four) hours as needed for Wheezing or Shortness of Breath. Rescue    ALBUTEROL (VENTOLIN HFA) 90 MCG/ACTUATION INHALER    Inhale 2 puffs into the lungs every 6 (six) hours as needed for Wheezing. Rescue    ALCOHOL SWABS (DROPSAFE ALCOHOL PREP PADS) PADM    USE TO TEST BLOOD SUGAR TWO TIMES DAILY    ASPIRIN 81 MG CHEW    CHEW AND SWALLOW 1 TABLET EVERY DAY    ATORVASTATIN (LIPITOR) 20 MG TABLET    TAKE 1 TABLET EVERY DAY    AZATHIOPRINE (IMURAN) 50 MG TAB    Take 2 tablets (100 mg total) by mouth 2 (two) times daily.    BACLOFEN (LIORESAL) 10 MG TABLET    Take 10 mg by mouth 2 (two) times daily.    BLOOD-GLUCOSE METER (ACCU-CHEK GUIDE GLUCOSE METER) MISC    USE AS DIRECTED.    BUSPIRONE (BUSPAR) 10 MG TABLET    TAKE 1 TABLET THREE TIMES DAILY (DOSE INCREASE)    CHOLECALCIFEROL, VITAMIN D3, (VITAMIN D3) 50 MCG (2,000 UNIT) CAP CAPSULE    Take 2,000 Units by mouth once daily.    DROPLET INSULIN SYR,HALF UNIT, 0.5 ML 31 GAUGE X 5/16" SYRG    USE TO INJECT INSULIN AS DIRECTED    DULOXETINE (CYMBALTA) 20 MG CAPSULE    TAKE 1 CAPSULE EVERY DAY    ERYTHROMYCIN (ROMYCIN) OPHTHALMIC OINTMENT        ESOMEPRAZOLE (NEXIUM) 40 MG CAPSULE    TAKE 1 CAPSULE EVERY DAY    FERROUS GLUCONATE (FERGON) 324 " "MG TABLET    Take 1 tablet (324 mg total) by mouth 2 (two) times a day.    FINASTERIDE (PROSCAR) 5 MG TABLET    TAKE 1 TABLET EVERY DAY    FUROSEMIDE (LASIX) 40 MG TABLET    TAKE 1 TABLET EVERY DAY    GABAPENTIN (NEURONTIN) 300 MG CAPSULE    TAKE 1 CAPSULE THREE TIMES DAILY    HYDROCODONE-ACETAMINOPHEN (NORCO)  MG PER TABLET    TAKE 1 TABLET BY MOUTH EVERY 8 HOURS AS NEEDED FOR PAIN FOR 30 DAYS    INSULIN GLARGINE U-100, LANTUS, (LANTUS U-100 INSULIN) 100 UNIT/ML INJECTION    INJECT 45 UNITS UNDER THE SKIN IN THE MORNING AND 40 UNITS AT NIGHT    LANCETS (ACCU-CHEK SOFTCLIX LANCETS) MISC    TEST BLOOD SUGAR ONE TIME DAILY    LOSARTAN (COZAAR) 100 MG TABLET    TAKE 1 TABLET EVERY DAY    METFORMIN (GLUCOPHAGE) 1000 MG TABLET    TAKE 1 TABLET TWICE DAILY    METOPROLOL TARTRATE (LOPRESSOR) 25 MG TABLET    Take 25 mg by mouth 2 (two) times daily.    PEN NEEDLE, DIABETIC (DROPLET PEN NEEDLE) 32 GAUGE X 5/32" NDLE    USE FOR INSULIN EVERY DAY    PIOGLITAZONE (ACTOS) 45 MG TABLET    TAKE 1 TABLET EVERY DAY    TAMSULOSIN (FLOMAX) 0.4 MG CAP    TAKE 1 CAPSULE AT BEDTIME    TRADJENTA 5 MG TAB TABLET    Take 5 mg by mouth once daily.   Changed and/or Refilled Medications    Modified Medication Previous Medication    PREDNISONE (DELTASONE) 5 MG TABLET predniSONE (DELTASONE) 5 MG tablet       Alternating doses of 10 mg/7.5 mg every other day    Take 2 tablets (10 mg total) by mouth once daily.        Vitals:    05/06/25 0850   BP: 122/82   Pulse: (!) 53        General: alert and oriented, no acute distress, no audible wheezes, pulse intact, no edema    Cognition and Comprehension  Speech and language intact  Follows commands  Speech fluent  Attention intact  Memory for recent events intact from history taking  Affect pleasant  Fund of knowledge adequate    Cranial nerves  II. Optic: Visual fields full to confrontation both eyes  III, IV, VI. Oculomotor: Intact, Pupils equal, round and reactive to light, no nystagmus  V. " Trigeminal: sensation to light touch normal  VII. Mild OO weakness  VIII. Hearing intact to spoken voice  IX/X. Glossopharyngeal/Vagus: Voice normal, palate rises symmetrically  XI. Axillary: Shoulder shrug normal  XII. Hypoglossal: Intact    Muscle Strength and Tone  Normal upper extremity tone  Normal lower extremity tone  Normal upper extremity strength  Normal lower extremity strength      Coordination and Gait  Finger to nose normal  Gait normal     1. Myasthenia gravis  Overview:  Patient initally diagnosed in 2014.  Established with Dr. Billy February 2021.  Initial symptoms were ptosis and dysphagia.  Ultimately required vent for this flare.  He was initally started on mestinon, but did not tolerate (diarrhea).  He has been on Cellcept in the past.  Has been on Imuran and prednisone ever since.  Last flare was in April 2020 where he received IVIG during inpatient hospitalization.  Since establishing with Dr. Billy, his prednisone dose has been slowly weaned with goal of 10 mg daily. He is antibody positive.    Chest CT 6/11/2014-no evidence of thymoma    Antibodies 09/2021:  AChR binding 3.85  AChR blocking 47        Orders:  -     predniSONE (DELTASONE) 5 MG tablet; Alternating doses of 10 mg/7.5 mg every other day  Dispense: 180 tablet; Refill: 3     Try prednisone 10/7.5 until next visit with goal of weaning  Cont imuran  Labs q 6 months